# Patient Record
Sex: MALE | Race: WHITE | Employment: UNEMPLOYED | URBAN - METROPOLITAN AREA
[De-identification: names, ages, dates, MRNs, and addresses within clinical notes are randomized per-mention and may not be internally consistent; named-entity substitution may affect disease eponyms.]

---

## 2019-06-19 ENCOUNTER — HOSPITAL ENCOUNTER (INPATIENT)
Facility: CLINIC | Age: 39
LOS: 41 days | Discharge: HOME | DRG: 885 | End: 2019-07-30
Attending: EMERGENCY MEDICINE | Admitting: PSYCHIATRY & NEUROLOGY
Payer: MEDICARE

## 2019-06-19 DIAGNOSIS — F25.1 SCHIZOAFFECTIVE DISORDER, DEPRESSIVE TYPE (H): ICD-10-CM

## 2019-06-19 DIAGNOSIS — J45.909 ASTHMA, UNSPECIFIED ASTHMA SEVERITY, UNSPECIFIED WHETHER COMPLICATED, UNSPECIFIED WHETHER PERSISTENT: ICD-10-CM

## 2019-06-19 DIAGNOSIS — I10 HYPERTENSION, UNSPECIFIED TYPE: Primary | ICD-10-CM

## 2019-06-19 PROBLEM — F29 PSYCHOSIS (H): Status: ACTIVE | Noted: 2019-06-19

## 2019-06-19 LAB
AMPHETAMINES UR QL SCN: NEGATIVE
BARBITURATES UR QL: NEGATIVE
BENZODIAZ UR QL: NEGATIVE
CANNABINOIDS UR QL SCN: NEGATIVE
COCAINE UR QL: NEGATIVE
ETHANOL UR QL SCN: NEGATIVE
OPIATES UR QL SCN: NEGATIVE

## 2019-06-19 PROCEDURE — 12400001 ZZH R&B MH UMMC

## 2019-06-19 PROCEDURE — 99285 EMERGENCY DEPT VISIT HI MDM: CPT | Mod: 25 | Performed by: EMERGENCY MEDICINE

## 2019-06-19 PROCEDURE — 80320 DRUG SCREEN QUANTALCOHOLS: CPT | Performed by: FAMILY MEDICINE

## 2019-06-19 PROCEDURE — 90791 PSYCH DIAGNOSTIC EVALUATION: CPT

## 2019-06-19 PROCEDURE — 80307 DRUG TEST PRSMV CHEM ANLYZR: CPT | Performed by: FAMILY MEDICINE

## 2019-06-19 PROCEDURE — 99285 EMERGENCY DEPT VISIT HI MDM: CPT | Mod: Z6 | Performed by: EMERGENCY MEDICINE

## 2019-06-19 RX ORDER — LORAZEPAM 0.5 MG/1
0.5 TABLET ORAL 2 TIMES DAILY PRN
Status: DISCONTINUED | OUTPATIENT
Start: 2019-06-19 | End: 2019-06-28

## 2019-06-19 RX ORDER — ALBUTEROL SULFATE 90 UG/1
2-4 AEROSOL, METERED RESPIRATORY (INHALATION) EVERY 6 HOURS PRN
Status: ON HOLD | COMMUNITY
Start: 2018-09-20 | End: 2019-07-30

## 2019-06-19 RX ORDER — HYDROXYZINE HYDROCHLORIDE 25 MG/1
25 TABLET, FILM COATED ORAL EVERY 4 HOURS PRN
Status: DISCONTINUED | OUTPATIENT
Start: 2019-06-19 | End: 2019-07-30 | Stop reason: HOSPADM

## 2019-06-19 RX ORDER — OLANZAPINE 10 MG/1
10 TABLET ORAL
Status: DISCONTINUED | OUTPATIENT
Start: 2019-06-19 | End: 2019-07-10

## 2019-06-19 RX ORDER — ALBUTEROL SULFATE 90 UG/1
2-4 AEROSOL, METERED RESPIRATORY (INHALATION) EVERY 4 HOURS PRN
Status: DISCONTINUED | OUTPATIENT
Start: 2019-06-19 | End: 2019-07-30 | Stop reason: HOSPADM

## 2019-06-19 RX ORDER — OLANZAPINE 10 MG/2ML
10 INJECTION, POWDER, FOR SOLUTION INTRAMUSCULAR
Status: DISCONTINUED | OUTPATIENT
Start: 2019-06-19 | End: 2019-07-10

## 2019-06-19 RX ORDER — FLUOXETINE 10 MG/1
10 CAPSULE ORAL DAILY
Status: ON HOLD | COMMUNITY
End: 2019-07-30

## 2019-06-19 RX ORDER — ACETAMINOPHEN 325 MG/1
650 TABLET ORAL EVERY 4 HOURS PRN
Status: DISCONTINUED | OUTPATIENT
Start: 2019-06-19 | End: 2019-07-30 | Stop reason: HOSPADM

## 2019-06-19 RX ORDER — TRAZODONE HYDROCHLORIDE 50 MG/1
50 TABLET, FILM COATED ORAL
Status: DISCONTINUED | OUTPATIENT
Start: 2019-06-19 | End: 2019-07-30 | Stop reason: HOSPADM

## 2019-06-19 ASSESSMENT — ENCOUNTER SYMPTOMS
DECREASED CONCENTRATION: 1
HALLUCINATIONS: 1
CONFUSION: 1
HYPERACTIVE: 1
AGITATION: 1

## 2019-06-19 ASSESSMENT — ACTIVITIES OF DAILY LIVING (ADL)
ORAL_HYGIENE: PROMPTS
HYGIENE/GROOMING: HANDWASHING;SHOWER;PROMPTS
LAUNDRY: UNABLE TO COMPLETE
DRESS: PROMPTS

## 2019-06-19 NOTE — ED NOTES
Gets a little agitated at times when talking about Abilify; per Dr. Barraza's HO hold paperwork, pt thinks he has been off of Abilify for one year but has been off it since January;progressively worsening paranoia and agitation; loud, defensive; pt not eating well, disorganized and poor hygiene; verbally abusive with family.  Please see lengthy note by Dr Barraza on hold paperwork on pt chart.

## 2019-06-20 LAB
ALBUMIN SERPL-MCNC: 3.4 G/DL (ref 3.4–5)
ALP SERPL-CCNC: 67 U/L (ref 40–150)
ALT SERPL W P-5'-P-CCNC: 44 U/L (ref 0–70)
ANION GAP SERPL CALCULATED.3IONS-SCNC: 6 MMOL/L (ref 3–14)
AST SERPL W P-5'-P-CCNC: 20 U/L (ref 0–45)
BASOPHILS # BLD AUTO: 0 10E9/L (ref 0–0.2)
BASOPHILS NFR BLD AUTO: 0.3 %
BILIRUB SERPL-MCNC: 0.7 MG/DL (ref 0.2–1.3)
BUN SERPL-MCNC: 20 MG/DL (ref 7–30)
CALCIUM SERPL-MCNC: 8.3 MG/DL (ref 8.5–10.1)
CHLORIDE SERPL-SCNC: 108 MMOL/L (ref 94–109)
CHOLEST SERPL-MCNC: 160 MG/DL
CO2 SERPL-SCNC: 29 MMOL/L (ref 20–32)
CREAT SERPL-MCNC: 0.82 MG/DL (ref 0.66–1.25)
DEPRECATED CALCIDIOL+CALCIFEROL SERPL-MC: 26 UG/L (ref 20–75)
DIFFERENTIAL METHOD BLD: NORMAL
EOSINOPHIL # BLD AUTO: 0.2 10E9/L (ref 0–0.7)
EOSINOPHIL NFR BLD AUTO: 1.5 %
ERYTHROCYTE [DISTWIDTH] IN BLOOD BY AUTOMATED COUNT: 13 % (ref 10–15)
GFR SERPL CREATININE-BSD FRML MDRD: >90 ML/MIN/{1.73_M2}
GLUCOSE SERPL-MCNC: 92 MG/DL (ref 70–99)
HCT VFR BLD AUTO: 46.3 % (ref 40–53)
HDLC SERPL-MCNC: 40 MG/DL
HGB BLD-MCNC: 15.1 G/DL (ref 13.3–17.7)
IMM GRANULOCYTES # BLD: 0 10E9/L (ref 0–0.4)
IMM GRANULOCYTES NFR BLD: 0.2 %
LDLC SERPL CALC-MCNC: 98 MG/DL
LYMPHOCYTES # BLD AUTO: 2.8 10E9/L (ref 0.8–5.3)
LYMPHOCYTES NFR BLD AUTO: 28.1 %
MCH RBC QN AUTO: 27.9 PG (ref 26.5–33)
MCHC RBC AUTO-ENTMCNC: 32.6 G/DL (ref 31.5–36.5)
MCV RBC AUTO: 86 FL (ref 78–100)
MONOCYTES # BLD AUTO: 0.7 10E9/L (ref 0–1.3)
MONOCYTES NFR BLD AUTO: 6.6 %
NEUTROPHILS # BLD AUTO: 6.3 10E9/L (ref 1.6–8.3)
NEUTROPHILS NFR BLD AUTO: 63.3 %
NONHDLC SERPL-MCNC: 120 MG/DL
NRBC # BLD AUTO: 0 10*3/UL
NRBC BLD AUTO-RTO: 0 /100
PLATELET # BLD AUTO: 239 10E9/L (ref 150–450)
POTASSIUM SERPL-SCNC: 4.2 MMOL/L (ref 3.4–5.3)
PROT SERPL-MCNC: 6.8 G/DL (ref 6.8–8.8)
RBC # BLD AUTO: 5.41 10E12/L (ref 4.4–5.9)
SODIUM SERPL-SCNC: 143 MMOL/L (ref 133–144)
TRIGL SERPL-MCNC: 108 MG/DL
TSH SERPL DL<=0.005 MIU/L-ACNC: 1.23 MU/L (ref 0.4–4)
WBC # BLD AUTO: 10 10E9/L (ref 4–11)

## 2019-06-20 PROCEDURE — 12400001 ZZH R&B MH UMMC

## 2019-06-20 PROCEDURE — 80061 LIPID PANEL: CPT | Performed by: STUDENT IN AN ORGANIZED HEALTH CARE EDUCATION/TRAINING PROGRAM

## 2019-06-20 PROCEDURE — 85025 COMPLETE CBC W/AUTO DIFF WBC: CPT | Performed by: STUDENT IN AN ORGANIZED HEALTH CARE EDUCATION/TRAINING PROGRAM

## 2019-06-20 PROCEDURE — 99223 1ST HOSP IP/OBS HIGH 75: CPT | Mod: AI | Performed by: PSYCHIATRY & NEUROLOGY

## 2019-06-20 PROCEDURE — 36415 COLL VENOUS BLD VENIPUNCTURE: CPT | Performed by: STUDENT IN AN ORGANIZED HEALTH CARE EDUCATION/TRAINING PROGRAM

## 2019-06-20 PROCEDURE — 84443 ASSAY THYROID STIM HORMONE: CPT | Performed by: STUDENT IN AN ORGANIZED HEALTH CARE EDUCATION/TRAINING PROGRAM

## 2019-06-20 PROCEDURE — A9270 NON-COVERED ITEM OR SERVICE: HCPCS | Mod: GY | Performed by: STUDENT IN AN ORGANIZED HEALTH CARE EDUCATION/TRAINING PROGRAM

## 2019-06-20 PROCEDURE — 25000132 ZZH RX MED GY IP 250 OP 250 PS 637: Mod: GY | Performed by: STUDENT IN AN ORGANIZED HEALTH CARE EDUCATION/TRAINING PROGRAM

## 2019-06-20 PROCEDURE — 82306 VITAMIN D 25 HYDROXY: CPT | Performed by: STUDENT IN AN ORGANIZED HEALTH CARE EDUCATION/TRAINING PROGRAM

## 2019-06-20 PROCEDURE — 80053 COMPREHEN METABOLIC PANEL: CPT | Performed by: STUDENT IN AN ORGANIZED HEALTH CARE EDUCATION/TRAINING PROGRAM

## 2019-06-20 RX ORDER — MULTIPLE VITAMINS W/ MINERALS TAB 9MG-400MCG
1 TAB ORAL DAILY
COMMUNITY

## 2019-06-20 RX ADMIN — FLUOXETINE 30 MG: 20 CAPSULE ORAL at 09:06
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
## 2019-06-20 NOTE — PROGRESS NOTES
Case Management Note  6/20/2019    Contacted [facility] pre-petition screening, left  notifying of petition for commitment ([phone]). Faxed petition paperwork to [phone].    Addendum: Received call from Razia who processed petition. Cecilia Gaffney ([phone]) assigned as pre-petition screener. She will likely be out tomorrow around 10 AM. Would like to be notified whether patient has been informed of petition. This writer did not inform patient of petition to commit.     Completed initial psychosocial. Pt declined to complete personal plan of care at this time.     Saundra Dubois, MIRTHA, LADC

## 2019-06-20 NOTE — PLAN OF CARE
Problem: Adult Inpatient Plan of Care  Goal: Rounds/Family Conference  6/20/2019 1136 by Saundra Dubois [facility]  Outcome: No Change  Flowsheets (Taken 6/20/2019 1136)  Participants: ; nursing; physician  Note:   BEHAVIORAL TEAM DISCUSSION    Participants: Dr. Marjan MD; Dr. Suzie MD- Resident; Willis Jacobson-Student; Fannie Sofia- Student; Carolina Sheffield; Saundra Dubois-[facility]  Progress: New admit; continuing to assess  Continued Stay Criteria/Rationale: Patient is experiencing active psychosis, has exhibited poor self-care (eating rotten food) and aggressive behavior towards others. Requires further intervention, assessment.   Medical/Physical: Patient has been medication non-compliant since January 2019.   Precautions:   Behavioral Orders   Procedures    Code 1 - Restrict to Unit    Elopement precautions    Routine Programming     As clinically indicated    Single Room    Status 15     Every 15 minutes.     Plan: Complete psychosocial assessment, evaluate for decision  making capacity; possibility of petition for MI commitment w/Nicole if pt is determined to be unable to make decisions for himself and or a danger to self/others.   Rationale for change in precautions or plan: No change.
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
## 2019-06-20 NOTE — ED NOTES
Bed: BEC1  Expected date: 6/9/19  Expected time:   Means of arrival:   Comments:  for Assessments
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
## 2019-06-20 NOTE — PROGRESS NOTES
Patient slept 5 hours overnight. Patient unable to remain asleep. Patient closing his door with authority when staff is rounding.

## 2019-06-20 NOTE — ED NOTES
ED to Behavioral Floor Handoff    SITUATION  Texginny Garland is a 39 year old male who speaks English and lives in a home alone The patient arrived in the ED by private car from home with a complaint of Agitation (stopped taking abilify in January, slowly becoming increasingly agitated; seen talking to himself, rubbing against someone and then started yelling at them, C & C for EMS) and Paranoid (Hx of schizoaffective disorder; PD called to scene where pt was waving a crowbar and talking to self)  .The patient's current symptoms started/worsened 4 months(s) ago and during this time the symptoms have increased.   In the ED, pt was diagnosed with   Final diagnoses:   Schizoaffective disorder, depressive type (H)        Initial vitals were: BP: 137/82  Pulse: 86  Temp: 99.4  F (37.4  C)  Resp: 18  SpO2: 96 %   --------  Is the patient diabetic? No   If yes, last blood glucose? --     If yes, was this treated in the ED? --  --------  Is the patient inebriated (ETOH) No or Impaired on other substances? No  MSSA done? N/A  Last MSSA score: --    Were withdrawal symptoms treated? N/A  Does the patient have a seizure history? No. If yes, date of most recent seizure--  --------  Is the patient patient experiencing suicidal ideation? denies current or recent suicidal ideation     Homicidal ideation? denies current or recent homicidal ideation or behaviors.    Self-injurious behavior/urges? denies current or recent self injurious behavior or ideation.  ------  Was pt aggressive in the ED No  Was a code called No  Is the pt now cooperative? Yes  -------  Meds given in ED: Medications - No data to display   Family present during ED course? Yes  Family currently present? Yes    BACKGROUND  Does the patient have a cognitive impairment or developmental disability? No  Allergies:   Allergies   Allergen Reactions     Mold      Itchy eyes, congestion     Nkda [No Known Drug Allergies]    .   Social demographics are   Social  History     Socioeconomic History     Marital status: Single     Spouse name: None     Number of children: None     Years of education: None     Highest education level: None   Occupational History     None   Social Needs     Financial resource strain: None     Food insecurity:     Worry: None     Inability: None     Transportation needs:     Medical: None     Non-medical: None   Tobacco Use     Smoking status: Former Smoker   Substance and Sexual Activity     Alcohol use: Not Currently     Drug use: Not Currently     Sexual activity: None   Lifestyle     Physical activity:     Days per week: None     Minutes per session: None     Stress: None   Relationships     Social connections:     Talks on phone: None     Gets together: None     Attends Roman [employer] service: None     Active member of club or organization: None     Attends meetings of clubs or organizations: None     Relationship status: None     Intimate partner violence:     Fear of current or ex partner: None     Emotionally abused: None     Physically abused: None     Forced sexual activity: None   Other Topics Concern     None   Social History Narrative     None        ASSESSMENT  Labs results   Labs Ordered and Resulted from Time of ED Arrival Up to the Time of Departure from the ED   DRUG ABUSE SCREEN 6 CHEM DEP URINE ([facility])      Imaging Studies: No results found for this or any previous visit (from the past 24 hour(s)).   Most recent vital signs /82   Pulse 86   Temp 99.4  F (37.4  C) (Oral)   Resp 18   SpO2 96%    Abnormal labs/tests/findings requiring intervention:---   Pain control: pt had none  Nausea control: pt had none    RECOMMENDATION  Are any infection precautions needed (MRSA, VRE, etc.)? No If yes, what infection? --  ---  Does the patient have mobility issues? independently. If yes, what device does the pt use? ---  ---  Is patient on 72 hour hold or commitment? Yes If on 72 hour hold, have hold and rights been given to patient?  Yes  Are admitting orders written if after 10 p.m. ?N/A  Tasks needing to be completed:---     Doris Elena    [phone] [facility]

## 2019-06-20 NOTE — PHARMACY-ADMISSION MEDICATION HISTORY
Admission medication history interview status for the 6/19/2019 admission is complete. See Epic admission navigator for allergy information, pharmacy, prior to admission medications and immunization status.     Medication history interview sources:  patient, [facility] records    Changes made to PTA medication list (reason)  Added: Multivitamin per patient  Deleted: aripiprazole (has not used in over a year per patient, not in [facility] records)  Changed: clarified fluoxetine dose to 30 mg qAM    Additional medication history information (including reliability of information, actions taken by pharmacist):  -patient knew all of his medications and how he uses them, including doses (confirmed with [facility] records)  -patient states he stopped abilify because it wasn't working, requested his doctors took him off of it. He did not report side effects, although [facility] records indicate it may have contributed to some irritability and muscle twitches.         Prior to Admission medications    Medication Sig Last Dose Taking? Auth Provider   FLUoxetine (PROZAC) 10 MG capsule Take 10 mg by mouth daily Take with 20 mg capsule to make 30 mg morning dose 6/19/2019 at Unknown time Yes Reported, Patient   FLUoxetine (PROZAC) 20 MG capsule Take 20 mg by mouth daily Take with 10 mg capsule to make 30 mg morning dose. 6/19/2019 at Unknown time Yes Reported, Patient   multivitamin w/minerals (THERA-VIT-M) tablet Take 1 tablet by mouth daily 6/18/2019 Yes Unknown, Entered By History   albuterol (VENTOLIN HFA) 108 (90 Base) MCG/ACT inhaler Inhale 2-4 puffs into the lungs every 6 hours as needed for shortness of breath / dyspnea  More than a month at Unknown time  Reported, Patient   LORazepam (ATIVAN) 0.5 MG tablet Take 0.5 mg by mouth every 6 hours as needed for anxiety. More than a month at Unknown time  Reported, Patient         Medication history completed by: Aniket Forde RP on 6/20/2019 at 12:48 PM

## 2019-06-20 NOTE — PROGRESS NOTES
"Patient has remained withdrawn to room all day. Angry and irritable upon approach. Asked patient if he had eaten lunch, \"Ya, I ate my fuckin lunch!\" patient refused EKG. Pacing in room and staring out the window. Monitoring.         06/20/19 1400   Behavioral Health   Thinking distractable   Orientation situation, disoriented;person: oriented;place: oriented   Memory confabulation   Insight poor   Judgement impaired   Affect irritable;angry   Mood irritable;anxious   Physical Appearance/Attire attire appropriate to age and situation   Hygiene neglected grooming - unclean body, hair, teeth   1. Wish to be Dead No   Activity withdrawn;isolative;restless     "
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
## 2019-06-20 NOTE — PROGRESS NOTES
06/19/19 2215   Patient Belongings   Did you bring any home meds/supplements to the hospital?  No   Patient Belongings locker   Patient Belongings Put in Hospital Secure Location (Security or Locker, etc.) keys;clothing   Belongings Search Yes  (Renny ORTEGA)   Clothing Search   (Renny ORTEGA)   Second Staff   (Zain PARKS )       Items brought in on admission (6/19/19):   T-shirt, shorts (w/string), socks, shoes (w/laces), keys        A               Admission:  I am responsible for any personal items that are not sent to the safe or pharmacy.  [facility] is not responsible for loss, theft or damage of any property in my possession.    Signature:  _________________________________ Date: _______  Time: _____                                              Staff Signature:  ____________________________ Date: ________  Time: _____      2nd Staff person, if patient is unable/unwilling to sign:    Signature: ________________________________ Date: ________  Time: _____     Discharge:  [facility] has returned all of my personal belongings:    Signature: _________________________________ Date: ________  Time: _____                                          Staff Signature:  ____________________________ Date: ________  Time: _____          Postop Diagnosis: same

## 2019-06-21 PROCEDURE — 99232 SBSQ HOSP IP/OBS MODERATE 35: CPT | Mod: GC | Performed by: PSYCHIATRY & NEUROLOGY

## 2019-06-21 PROCEDURE — 12400001 ZZH R&B MH UMMC

## 2019-06-21 PROCEDURE — 25000132 ZZH RX MED GY IP 250 OP 250 PS 637: Mod: GY | Performed by: STUDENT IN AN ORGANIZED HEALTH CARE EDUCATION/TRAINING PROGRAM

## 2019-06-21 RX ADMIN — FLUOXETINE 30 MG: 20 CAPSULE ORAL at 09:23

## 2019-06-21 ASSESSMENT — ACTIVITIES OF DAILY LIVING (ADL)
HYGIENE/GROOMING: INDEPENDENT
DRESS: INDEPENDENT
ORAL_HYGIENE: INDEPENDENT

## 2019-06-21 NOTE — PROGRESS NOTES
"Pt isolative to room entire shift, ate meals in room. Neglected ADL's. Pt will not make eye contact. Angry in the morning about breakfast, \"I don't want the fucking orange juice, I didn't get enough food, just get me more fucking food.\" Appears paranoid, but denies. Pt endorses depression, but when asked to rate on scale of 1-10 with 10 being high, pt says 10, but then quickly changes answer to 1 and says, \"I'm not very depressed.\" Irritable and dismissive when engaged. Polite in short interactions.      06/21/19 1500   Behavioral Health   Hallucinations denies / not responding to hallucinations   Thinking delusional;paranoid   Orientation situation, disoriented   Insight denial of illness   Judgement impaired   Eye Contact into space   Affect irritable   Mood depressed   Physical Appearance/Attire attire appropriate to age and situation   Hygiene neglected grooming - unclean body, hair, teeth   Suicidality other (see comments)  (denies )   1. Wish to be Dead No   2. Non-Specific Active Suicidal Thoughts  No   Self Injury other (see comment)  (denies )   Activity isolative   Speech clear;coherent   Medication Sensitivity no stated side effects;no observed side effects   Psychomotor / Gait balanced;steady   Activities of Daily Living   Hygiene/Grooming independent   Oral Hygiene independent   Dress independent   Room Organization independent     "

## 2019-06-21 NOTE — PROGRESS NOTES
"Patient remained isolative and withdrawn this evening. Patient ate supper in his room. Mom and sister visited, but left shortly after patient accused them of tampering with some personal items in his car. Patient was quite hostile at both, calling his sister a \"Nazi\" at one point. Monitoring.         06/20/19 2100   Behavioral Health   Thinking delusional;distractable   Orientation situation, disoriented;person: oriented;place: oriented   Memory confabulation   Insight poor;denial of illness   Judgement impaired   Affect angry;irritable   Mood irritable;anxious;labile   Physical Appearance/Attire attire appropriate to age and situation   1. Wish to be Dead No   Activity withdrawn;isolative;restless     "

## 2019-06-21 NOTE — PROGRESS NOTES
"    ----------------------------------------------------------------------------------------------------------  [facility], [place]   Psychiatric Progress Note  Hospital Day #2     Interim History:   The patient's care was discussed with the treatment team and chart notes were reviewed.    Sleep: 6.5 hrs  Scheduled Medications: Taken as prescribed  Staff Report: Withdrawn, irritable upon approach, remains in his room most of the time. Mom and sister visited; they left shortly after arrival; he accused them of tampering with his personal items and called his sister a \"Nazi.\"      Patient Interview: Patient was interviewed in his room on 22 NB. He remained prone on his bed and didn't make eye contact. Upon us entering, he stated \"oh it's you guys again, I already saw you today\" despite us not having seen Tex today, He was very irritable and abrupt with us. Reported no SI no HI. Seemed agitated, slight hand tremors visible. Repeatedly implied team was withholding information or feigning ignorance. He was not interested in talking to us for a longer period of time.    The risks, benefits, alternatives and side effects of any medication changes have been discussed and are understood by the patient and other caregivers.    Review of systems:     ROS was negative unless noted above.          Allergies:     Allergies   Allergen Reactions     Mold      Itchy eyes, congestion     Nkda [No Known Drug Allergies]             Psychiatric Examination:   /88 (BP Location: Right arm)   Pulse 71   Temp 98.4  F (36.9  C) (Oral)   Resp 18   Ht 1.829 m (6')   SpO2 97%   BMI 42.86 kg/m    Weight is 0 lbs 0 oz  Body mass index is 42.86 kg/m .    Appearance:  dressed in hospital scrubs, appeared as age stated, poorly groomed, BMI = 42.86 and slightly unkempt  Attitude:  evasive, guarded and uncooperative  Eye Contact:  poor , gaze averted when responding to questions  Mood:  \"Im fine\"  Affect:  " mood congruent, intensity is heightened and guarded, labile affect with brief periods of politeness  Speech:  short responses  Psychomotor Behavior:  no evidence of tardive dyskinesia, dystonia, or tics  Thought Process:  linear and illogical at times  Associations:  no loose associations  Thought Content:  no evidence of suicidal ideation or homicidal ideation, no obvious AH/VH. Quite paranoid, bordering on delusional.  Insight:  poor, denies need for hospitalization or medications  Judgment:  poor, denies need for hospitalization or medication  Oriented to:  time, person, and place  Attention Span and Concentration:  fair  Recent and Remote Memory:  limited  Language: speaks fluent english  Fund of Knowledge: Difficult to assess  Muscle Strength and Tone: normal  Gait and Station: Did not assess         Labs:       No results found for this or any previous visit (from the past 24 hour(s)).     Assessment    Diagnostic Impression: Tex Garland is a 39 year old male with a past psychiatric history of schizoaffective disorder depressed type and seasonal affective disorder presented due to paranoia, disorganization, and agitation in the context of medication nonadherence. The patient has no known family history of mental illness. Current social factors include unemployment, lives alone, not having a car or phone, father with Parkinson's/Lewy Body dementia. Psychological factors include previous diagnoses of schizoaffective disorder depressed type and seasonal affective disorders. Biological factors include medication non-compliance. There appears to be a strong biosocial component to his illness. Differential diagnosis include schizophrenia, schizoaffective disorder in the context of medication non-adherence, and substance abuse disorder. Leading diagnosis is schizophrenia given his marked disorganization and paranoia, and lack of strong mood symptoms and substance use. There does not appear to be a strong  mood component to his presentation during this hospitalization, and there haven't been major mood events since his last hospitalization eight years ago, which supports the diagnosis of schizophrenia more than that of schizoaffective disorder.    Hospital course: Tex Garland was admitted to station 22 NB on a 72 hour hold. Adding aripiprazole or other antipsychotics was discussed with the patient, but he refused. Teat recommended tapering off fluoxetine due to its adverse side effect, but he refused--since he is declining antipsychotics and therapeutic benefit of fluxoetine is uncertain, it was discontinued . Due to the need for [id number] in the past and a history of altercations with strangers, and poor hygiene including rotting food in apartment, there is concern for harm to self or to others. Thus, petition for commitment and rodriguez was initiated. The pre-petition screener came to talk to him today.    Discontinued Medications (& Rationale):  Fluoxetine- Anorgasmia    Medical course  Tex Garland was brought in by EMS to [facility] ED on 6/19/2019. He was transferred to [facility] for psychiatric evaluation.    Plan   Principal Diagnosis:   # Schizophrenia in the context of medication non-adherence.    Secondary psychiatric diagnoses of concern this admission:     Psychotropic Medications:  Modify:  -Discontinue Fluoxetine    Continue:  - Acetaminophen 650 mg PRN for pain  - Albuterol inhaler PRN for wheezing, shortness of breath/dyspnea  - Hydroxyzine 25 mg PRN for anxiety  - Lorazepam 0.5 mg PRN for anxiety  - Olanzapine 10 mg PRN for agitation  - Trazodone 50 mg PRN for sleep    Patient will be treated in therapeutic milieu with appropriate individual and group therapies as described.      Medical diagnoses to be addressed this admission:   none      Data: see above  Consults: None  Legal Status: 72-h Hold signed on 6/19/2019    Safety Assessment:   Behavioral Orders   Procedures      Code 1 - Restrict to Unit     Elopement precautions     Routine Programming     As clinically indicated     Single Room     Status 15     Every 15 minutes.       Disposition: unknown pending medication management and clinical stabilization    Kalennavjot Sofia, MS3     I was present with the medical student who participated in the service and in the  documentation of the note. I have verified the history and personally performed the  exam and medical decision making. I agree with the assessment and plan of  care as documented in the note.    Ifeanyi Larsen  PGY-1 Psychiatry    Psychiatry Attending Attestation:  This patient has been seen and evaluated by me, Jake Phillip M.D.  The patient's condition and treatment plan were discussed with the resident, and care coordinated with the CTC and RN. I reviewed, edited and agree with the findings and plan in this note.     Jake Phillip M.D.   of Psychiatry

## 2019-06-22 PROCEDURE — 12400001 ZZH R&B MH UMMC

## 2019-06-22 ASSESSMENT — ACTIVITIES OF DAILY LIVING (ADL)
DRESS: 0-->INDEPENDENT
FALL_HISTORY_WITHIN_LAST_SIX_MONTHS: NO
BATHING: 0-->INDEPENDENT
ORAL_HYGIENE: INDEPENDENT
SWALLOWING: 0-->SWALLOWS FOODS/LIQUIDS WITHOUT DIFFICULTY
AMBULATION: 0-->INDEPENDENT
TRANSFERRING: 0-->INDEPENDENT
RETIRED_COMMUNICATION: 0-->UNDERSTANDS/COMMUNICATES WITHOUT DIFFICULTY
RETIRED_EATING: 0-->INDEPENDENT
COGNITION: 0 - NO COGNITION ISSUES REPORTED
LAUNDRY: WITH SUPERVISION
TOILETING: 0-->INDEPENDENT
DRESS: SCRUBS (BEHAVIORAL HEALTH);STREET CLOTHES

## 2019-06-22 NOTE — PROGRESS NOTES
Patient remains isolative and withdrawn to room. Appetite sparse. Visibly irritable and anxious. Appears preoccupied. Mom visited this afternoon. Patient states he is unable to shower because of the design on the shower door. Patient also states he is leaving at 3pm today.          06/22/19 1400   Behavioral Health   Hallucinations appears responding   Thinking distractable;delusional;paranoid;poor concentration   Orientation person: oriented;place: oriented   Memory confabulation   Insight poor   Judgement impaired   Affect angry;tense;irritable   Mood anxious;irritable   Physical Appearance/Attire untidy;disheveled   Hygiene neglected grooming - unclean body, hair, teeth;body odor  (Encouraging shower. )   1. Wish to be Dead   (Difficult to assess. Patient guarded and oppositional.)   Activity restless;withdrawn;isolative   Speech pressured;rambling

## 2019-06-22 NOTE — PROGRESS NOTES
"   Pt lying in bed with head raised, looking out door window.   Pt came out briefly to grab snacks and walked briskly towards room.   Pt stopped when addressed by writer, but responded \"What's up with my 72 Hours?\"    When writer explained that it ceases on the weekend, pt replied \"Oh; that's it.  OK. Thankyou.\" , and continued to room.  "

## 2019-06-22 NOTE — PLAN OF CARE
Patient spent most of shift in room, including dinner which he quickly took to room after receiving.  Pt slightly reluctant to accept mother and sister in for visit; but the visit went without incident.  Pt's mother told staff prior to departure that patient thinks he will be able to leave tomorrow.

## 2019-06-23 PROCEDURE — 12400001 ZZH R&B MH UMMC

## 2019-06-23 NOTE — PROGRESS NOTES
Patient remains isolative and withdrawn to room. Patient eats all meals in rooms. Appetite good. Appears suspicious and is guarded. Mom and sister visiting today. Monitoring.         06/23/19 1331   Behavioral Health   Hallucinations appears responding   Thinking confused;distractable;delusional;paranoid   Orientation situation, disoriented;person: oriented;place: oriented   Insight denial of illness;poor   Judgement impaired   Affect tense   Mood irritable;depressed   1. Wish to be Dead No   Activity withdrawn;isolative   Speech pressured;rambling;flight of ideas

## 2019-06-24 PROCEDURE — 99232 SBSQ HOSP IP/OBS MODERATE 35: CPT | Mod: GC | Performed by: PSYCHIATRY & NEUROLOGY

## 2019-06-24 PROCEDURE — 12400001 ZZH R&B MH UMMC

## 2019-06-24 ASSESSMENT — ACTIVITIES OF DAILY LIVING (ADL)
DRESS: INDEPENDENT
ORAL_HYGIENE: INDEPENDENT
HYGIENE/GROOMING: INDEPENDENT
DRESS: STREET CLOTHES;INDEPENDENT
ORAL_HYGIENE: INDEPENDENT
HYGIENE/GROOMING: INDEPENDENT
LAUNDRY: UNABLE TO COMPLETE

## 2019-06-24 NOTE — PROGRESS NOTES
Keren from [facility] court called . Tex Garland was placed on a court hold at 15:52 on 6/24/2019,  notified

## 2019-06-24 NOTE — PLAN OF CARE
Patient spent most shift in room -- even supper-- often looking out door window.   Pt sarchastic at times.  Pt very odoriferous.

## 2019-06-24 NOTE — PROGRESS NOTES
06/24/19 1500   Behavioral Health   Hallucinations other (see comment)  (Unable to assess-pt refused check-in)   Thinking distractable   Orientation person: oriented   Memory other (see comment)  (unable to assess)   Insight other (see comment)  (unable to assess)   Judgement impaired   Eye Contact into space;out of corner of eyes   Affect blunted, flat   Mood mood is calm   Physical Appearance/Attire disheveled   Hygiene neglected grooming - unclean body, hair, teeth   Suicidality other (see comments)  (none stated nor observed)   1. Wish to be Dead   (none stated nor observed)   2. Non-Specific Active Suicidal Thoughts    (none stated nor observed\)   Self Injury other (see comment)  (none stated nor observed)   Activity isolative   Speech pressured;clear   Medication Sensitivity no stated side effects;no observed side effects   Psychomotor / Gait slow;steady   Activities of Daily Living   Hygiene/Grooming independent   Oral Hygiene independent   Dress independent   Laundry unable to complete   Room Organization independent     Tex had a stable shift. Pt was isolative in his room, only emerging to bring his tray or ask a question.     Pt refused check-in with staff. However, there were no signs of SI/SIB/behaviors of concern.     Other staff reported pt placed a bar of soap under paper near the entry of his room, potentially as an obstacle for people entering room. Writer also noticed pt's room was quite disorganized/messy.     Pt has no imminent questions or concerns expressed at this time.

## 2019-06-24 NOTE — PROGRESS NOTES
"    ----------------------------------------------------------------------------------------------------------  [facility], [place]   Psychiatric Progress Note  Hospital Day #5     Interim History:   The patient's care was discussed with the treatment team and chart notes were reviewed.    Sleep: 1.5 hrs  Scheduled Medications: None  Staff Report: Rating for depression fluctuates between 10 (highest) and 1 lowest in a single sentence. Agitated, yelled at one of the RNs because his fluoxetine wasn't given. Patient had a visit from mom and sister, which went well. Generally isolative to room, poor hygiene, refused to shower.     Patient Interview: Patient was interviewed in his room on 22 NB. He was awakened, and remained prone on his bed but didn't make eye contact. Patient's answers were short, generally one-word answers. When asked open-ended questions he would not expand on them. Things were \"good\" over the weekend and the meeting with his family was \"good\". When asked about showering he said \"no, the shower here sucks, it's too small\". Says he took a shower with water from his sink yesterday. Was informed he is now on a court hold and is awaiting trial, to which he replied \"fine\".     The risks, benefits, alternatives and side effects of any medication changes have been discussed and are understood by the patient and other caregivers.    Review of systems:     ROS was negative unless noted above.          Allergies:     Allergies   Allergen Reactions     Mold      Itchy eyes, congestion     Nkda [No Known Drug Allergies]             Psychiatric Examination:   BP (!) 142/93 (BP Location: Right arm)   Pulse 75   Temp 98.7  F (37.1  C) (Oral)   Resp 18   Ht 1.829 m (6')   SpO2 97%   BMI 42.86 kg/m    Weight is 0 lbs 0 oz  Body mass index is 42.86 kg/m .    Appearance:  dressed in hospital scrubs, appeared as age stated, poorly groomed, BMI = 42.86 and slightly unkempt  Attitude:  " "evasive, guarded and uncooperative  Eye Contact:  poor , gaze averted when responding to questions  Mood:  \"good\"  Affect:  mood congruent and intensity is blunted  Speech:  short responses, somewhat rapid  Psychomotor Behavior:  no evidence of tardive dyskinesia, dystonia, or tics  Thought Process:  linear and illogical at times  Associations:  no loose associations  Thought Content: based on past encounters, no evidence of suicidal ideation or homicidal ideation, no obvious AH/VH.   Insight:  poor  Judgment:  poor  Oriented to:  time, person, and place  Attention Span and Concentration:  fair  Recent and Remote Memory:  limited  Language: speaks fluent english  Fund of Knowledge: Difficult to assess  Muscle Strength and Tone: normal  Gait and Station: Did not assess         Labs:       No results found for this or any previous visit (from the past 24 hour(s)).     Assessment    Diagnostic Impression: Tex Garland is a 39 year old male with a past psychiatric history of schizoaffective disorder depressed type and seasonal affective disorder presented due to paranoia, disorganization, and agitation in the context of medication nonadherence. The patient has no known family history of mental illness. Current social factors include unemployment, lives alone, not having a car or phone, father with Parkinson's/Lewy Body dementia. Psychological factors include previous diagnoses of schizoaffective disorder depressed type and seasonal affective disorders. Biological factors include medication non-compliance. There appears to be a strong biosocial component to his illness. Differential diagnosis include schizophrenia, schizoaffective disorder in the context of medication non-adherence, and substance abuse disorder. Leading diagnosis is schizophrenia given his marked disorganization and paranoia, and lack of strong mood symptoms and substance use. There does not appear to be a strong mood component to his " presentation during this hospitalization, and there haven't been major mood events since his last hospitalization eight years ago, which supports the diagnosis of schizophrenia more than that of schizoaffective disorder.    Hospital course: Tex Graland was admitted to station 22 NB on a 72 hour hold. Restarting aripiprazole or other antipsychotics was discussed with the patient, but he refused, as he attributed anorgasmia to abilify. Team recommended tapering off fluoxetine due to its adverse side effect, but he refused--since he is declining antipsychotics and therapeutic benefit of fluxoetine is uncertain, it was discontinued . Due to the need for dxbaga-gr-[id number] in the past and a history of altercations with strangers, and poor hygiene including rotting food in apartment, there is concern for harm to self or to others. Thus, petition for commitment and rodriguez was initiated. The pre-petition screener came to talk to him last week, and a court hold is currently in place.    Discontinued Medications (& Rationale):  Fluoxetine- Anorgasmia    Medical course  Tex Garland was brought in by EMS to [facility] [place] ED on 6/19/2019. He was transferred to [facility] for psychiatric evaluation.    Plan   Principal Diagnosis:   # Schizophrenia in the context of medication non-adherence.    Secondary psychiatric diagnoses of concern this admission:     Psychotropic Medications:  Modify:    Continue:  - Acetaminophen 650 mg PRN for pain  - Albuterol inhaler PRN for wheezing, shortness of breath/dyspnea  - Hydroxyzine 25 mg PRN for anxiety  - Lorazepam 0.5 mg PRN for anxiety  - Olanzapine 10 mg PRN for agitation  - Trazodone 50 mg PRN for sleep    Patient will be treated in therapeutic milieu with appropriate individual and group therapies as described.      Medical diagnoses to be addressed this admission:   none      Data: see above  Consults: None  Legal Status: Court Hold    Safety Assessment:   Behavioral  Orders   Procedures     Code 1 - Restrict to Unit     Elopement precautions     Routine Programming     As clinically indicated     Single Room     Status 15     Every 15 minutes.       Disposition: unknown pending medication management and clinical stabilization    Fannie Sofia, MS3     I was present with the medical student who participated in the service and in the  documentation of the note. I have verified the history and personally performed the  exam and medical decision making. I agree with the assessment and plan of  care as documented in the note.    Ifeanyi Larsen  PGY-1 Psychiatry      Attestation:  This patient has been seen and evaluated by me, Shawna Lugo.  I have discussed this patient with the house staff team including the resident and medical student and I agree with the findings and plan in this note.    I have reviewed today's vital signs, medications, labs and imaging. Shawna Lugo M.D., Ph.D.

## 2019-06-24 NOTE — PLAN OF CARE
"2502 - 7833: Pt agitated and pacing in room. Out once for snack; guarded; walked rapidly back to room. Pt struggling to use sink in bathroom with motion sensor; noted to be cursing \"The sink is fucking broken.\" Staff offered to help with sink and patient refused \"It's working. Why the fuck are you in here. Get out.\" Pt not sleeping.    0350: Writer approached patient to ask if would like a medication to help with sleep; pt replied \"Yeah, I want my Prozac. They have been forgetting to give it to me.\" Writer informed patient that Prozac has been discontinued for several days and that patient has other medications available for sleep and agitation. Pt grew angry and yelled \"Why are you fucking with me? I want my 30 mg of Prozac.\" Pt continued to yell expletives at writer. Pt did calm and yell out \"Ok, bye.\"    Pt is not compliant taking PRN medications. Pt with poor insight into condition and struggling with ADLs. Pt suspicious of staff entering room to offer help with care, cleaning, or check-in. Pt refused check in by writer.    0605: Pt slept 1.5 hours on night shift. Did spend 3.5 hours lying in bed calmly at end of shift; reading or staring out window.  "

## 2019-06-25 PROCEDURE — 99232 SBSQ HOSP IP/OBS MODERATE 35: CPT | Mod: GC | Performed by: PSYCHIATRY & NEUROLOGY

## 2019-06-25 PROCEDURE — 12400001 ZZH R&B MH UMMC

## 2019-06-25 ASSESSMENT — ACTIVITIES OF DAILY LIVING (ADL)
HYGIENE/GROOMING: HANDWASHING;SHOWER;INDEPENDENT
LAUNDRY: WITH SUPERVISION
DRESS: STREET CLOTHES
ORAL_HYGIENE: INDEPENDENT

## 2019-06-25 NOTE — PROGRESS NOTES
"    ----------------------------------------------------------------------------------------------------------  [facility], [place]   Psychiatric Progress Note  Hospital Day #6     Interim History:   The patient's care was discussed with the treatment team and chart notes were reviewed.    Sleep: 4.5 hrs  Scheduled Medications: None  Staff Report:   Remains isolated and withdrawn. Continues to appear restless and paranoid. No SI and no HI. Slammed door to room repeatedly.     Patient Interview: Patient was interviewed in his room on 22 NB. He was lying on his bed with his eyes closed. Patient stated he had slept poorly, but did not want to take any PRNs to help with this. Patient was not cooperative to questioning. He was angry that we had stopped his fluoxetine. He did not want to take any antipsychotics. He spoke in a sarcastic tone and ended the conversation with \"get away from me.\" When asked if he would be interested in doing some copper molding in OT he said no. Is not willing to consider going on any anti-psychotic medications, or any others besides prozac.     Treatment team spoke with patient's mother Kirk. She is disappointed that Tex is so psychotic he needs to stay in his room all day. She wonders if staff are doing enough to occupy him during his stay, and was also frustrated that Tex did not like his bathroom door, \"Why would you put a door like that in a psychosis unit, it should be neutral\". Reports that during prior hospitalization Tex liked doing copper molding in ECT. Says she will continue visit him everyday.     The risks, benefits, alternatives and side effects of any medication changes have been discussed and are understood by the patient and other caregivers.    Review of systems:     ROS was negative unless noted above.          Allergies:     Allergies   Allergen Reactions     Mold      Itchy eyes, congestion     Nkda [No Known Drug Allergies]             " Psychiatric Examination:   BP (!) 136/95   Pulse 98   Temp 97.9  F (36.6  C)   Resp 16   Ht 1.829 m (6')   SpO2 96%   BMI 42.86 kg/m    Weight is 0 lbs 0 oz  Body mass index is 42.86 kg/m .    Appearance:  dressed in hospital scrubs, appeared as age stated, poorly groomed, BMI = 42.86 and slightly unkempt  Attitude:  evasive, guarded, uncooperative and sarcastic  Eye Contact:  poor , kept his eyes closed during the entire interaction  Mood:  frustrated  Affect:  mood congruent and intensity is blunted  Speech:  short responses, somewhat rapid  Psychomotor Behavior:  no evidence of tardive dyskinesia, dystonia, or tics  Thought Process:  linear and illogical at times  Associations:  no loose associations  Thought Content: based on past encounters, no evidence of suicidal ideation or homicidal ideation, no obvious AH/VH.   Insight:  poor  Judgment:  poor  Oriented to:  time, person, and place  Attention Span and Concentration:  poor  Recent and Remote Memory:  limited  Language: speaks fluent english  Fund of Knowledge: Difficult to assess  Muscle Strength and Tone: Did not assess  Gait and Station: Did not assess         Labs:       No results found for this or any previous visit (from the past 24 hour(s)).     Assessment    Diagnostic Impression: Tex Garland is a 39 year old male with a past psychiatric history of schizoaffective disorder depressed type and seasonal affective disorder presented due to paranoia, disorganization, and agitation in the context of medication nonadherence. The patient has no known family history of mental illness. Current social factors include unemployment, lives alone, not having a car or phone, father with Parkinson's/Lewy Body dementia. Psychological factors include previous diagnoses of schizoaffective disorder depressed type and seasonal affective disorders. Biological factors include medication non-compliance. There appears to be a strong biosocial component to  his illness. Differential diagnosis include schizophrenia, schizoaffective disorder in the context of medication non-adherence, and substance abuse disorder. Leading diagnosis is schizophrenia given his marked disorganization and paranoia, and lack of strong mood symptoms and substance use. There does not appear to be a strong mood component to his presentation during this hospitalization, and there haven't been major mood events since his last hospitalization eight years ago, which supports the diagnosis of schizophrenia more than that of schizoaffective disorder.    Hospital course: Tex Garland was admitted to station 22 NB on a 72 hour hold. Restarting aripiprazole or other antipsychotics was discussed with the patient, but he refused, as he attributed anorgasmia to abilify. Team recommended tapering off fluoxetine due to its adverse side effect, but he refused--since he is declining antipsychotics and therapeutic benefit of fluxoetine is uncertain, it was discontinued . Due to the need for [id number] in the past and a history of altercations with strangers, and poor hygiene including rotting food in apartment, there is concern for harm to self or to others. Thus, petition for commitment and nicole was initiated. The pre-petition screener came to talk to him last week, and a court hold is currently in place; we are waiting for commitment and Nicole.    Discontinued Medications (& Rationale):  Fluoxetine- Anorgasmia    Medical course  Tex Garland was brought in by EMS to [facility] ED on 6/19/2019. He was transferred to [facility] for psychiatric evaluation.    Plan   Principal Diagnosis:   # Schizophrenia in the context of medication non-adherence.    Secondary psychiatric diagnoses of concern this admission:     Psychotropic Medications:  Modify:    Continue:  - Acetaminophen 650 mg PRN for pain  - Albuterol inhaler PRN for wheezing, shortness of breath/dyspnea  - Hydroxyzine 25 mg PRN  for anxiety  - Lorazepam 0.5 mg PRN for anxiety  - Olanzapine 10 mg PRN for agitation  - Trazodone 50 mg PRN for sleep    Patient will be treated in therapeutic milieu with appropriate individual and group therapies as described.      Medical diagnoses to be addressed this admission:   none      Data: see above  Consults: None  Legal Status: Court Hold    Safety Assessment:   Behavioral Orders   Procedures     Code 1 - Restrict to Unit     Elopement precautions     Routine Programming     As clinically indicated     Single Room     Status 15     Every 15 minutes.       Disposition: unknown pending medication management and clinical stabilization    Fannie Sofia, MS3     I was present with the medical student who participated in the service and in the  documentation of the note. I have verified the history and personally performed the  exam and medical decision making. I agree with the assessment and plan of  care as documented in the note.    Ifeanyi Larsen  PGY-1 Psychiatry    Psychiatry Attending Attestation:  This patient has been seen and evaluated by me, Jake Phillip M.D.  The patient's condition and treatment plan were discussed with the resident, and care coordinated with the CTC and RN. I reviewed, edited and agree with the findings and plan in this note.     Jake Phillip M.D.   of Psychiatry

## 2019-06-25 NOTE — PROGRESS NOTES
Patient remains isolative and withdrawn. Appears irritable and restless. Patient is fairly pleasant when approached, however, is superficial and offers little information. Guarded. Appetite is good. Patient eats all meals in room. Poor hygiene. Encouraging shower but patient refuses.          06/25/19 1149   Behavioral Health   Hallucinations appears responding   Thinking distractable;delusional;paranoid;poor concentration   Orientation situation, disoriented;person: oriented;place: oriented   Memory confabulation   Insight poor;denial of illness   Judgement impaired   Affect tense   Mood anxious;irritable   Physical Appearance/Attire attire appropriate to age and situation   Hygiene neglected grooming - unclean body, hair, teeth   1. Wish to be Dead No   Activity withdrawn;restless;isolative   Speech pressured;flight of ideas

## 2019-06-25 NOTE — PROGRESS NOTES
Pt was slamming door, writer redirected pt.    Writer - Jud Nice, please don't slam don't slam your door.    PT - Oh, okay.    PT - When's dinner.    Writer - 6:00pm, It's about 4:00pm currently.    PT - Marv, I gave it to you but you didn't give it back to me anyway.             - Pt then Shut door

## 2019-06-25 NOTE — PROGRESS NOTES
Pt was isolative and socially withdrawn throughout most of the shift. Pt was mostly observed pacing around room, talking to himself in room, journaling, eating dinner (in room), and meeting with visitors. However, pt became upset while meeting with visitors when told about court hold. Pt presented with a tense to irritable affect and appeared restless and extremely paranoid throughout the shift. Pt was observed slamming his door repeatedly during shift and was somewhat redirectable from this behavior. Pt did not report experiencing SI, HI, or SIB and no clear indication of these symptoms was observed.        06/24/19 2148   Behavioral Health   Hallucinations   (unsubstantiated)   Thinking distractable;paranoid;poor concentration   Orientation person: oriented   Memory   (unsubstantiated)   Insight poor   Judgement impaired   Eye Contact out of corner of eyes;at examiner   Affect angry;tense;irritable   Mood anxious;irritable   Physical Appearance/Attire untidy   Hygiene neglected grooming - unclean body, hair, teeth   Suicidality   (pt did not report thoughts or urges)   Self Injury   (pt did not report thoughts or urges)   Elopement Distress about legal situation (holds for mental health or criminal)   Activity isolative;withdrawn;restless   Speech pressured;clear;coherent   Medication Sensitivity no stated side effects;no observed side effects   Psychomotor / Gait fast;balanced;steady   Activities of Daily Living   Hygiene/Grooming independent   Oral Hygiene independent   Dress street clothes;independent   Laundry   (pt did not do laundry)   Room Organization independent   Activity   Activity Assistance Provided independent

## 2019-06-26 PROCEDURE — 99232 SBSQ HOSP IP/OBS MODERATE 35: CPT | Mod: GC | Performed by: PSYCHIATRY & NEUROLOGY

## 2019-06-26 PROCEDURE — 12400001 ZZH R&B MH UMMC

## 2019-06-26 ASSESSMENT — ACTIVITIES OF DAILY LIVING (ADL)
DRESS: INDEPENDENT
HYGIENE/GROOMING: INDEPENDENT
ORAL_HYGIENE: PROMPTS
HYGIENE/GROOMING: PROMPTS

## 2019-06-26 NOTE — PROGRESS NOTES
Isolative to room the whole shift. Meals brought to him in his room. Angry, irritable much of shift. Freq door slamming. Angry with team, profanity. No shower.       06/26/19 1400   Behavioral Health   Hallucinations appears responding   Thinking paranoid;distractable;delusional;poor concentration   Orientation situation, disoriented   Memory confabulation   Insight poor;denial of illness   Judgement impaired   Eye Contact out of corner of eyes;at examiner   Affect tense;irritable;angry   Mood anxious;irritable   Physical Appearance/Attire disheveled   Hygiene neglected grooming - unclean body, hair, teeth   1. Wish to be Dead No   2. Non-Specific Active Suicidal Thoughts  No   Activity isolative;refusal   Speech pressured   Psychomotor / Gait balanced;steady   Psycho Education   Type of Intervention structured groups   Response refuses   Activities of Daily Living   Hygiene/Grooming independent  (Declined all)

## 2019-06-26 NOTE — PROGRESS NOTES
Re: Civil Committment    Received call from [facility] and they are supporting petition.    Dipti Cobb, LPCC, LADC

## 2019-06-26 NOTE — PLAN OF CARE
"Tex was very angry at his mother yesterday. Today he is less angry. Was asked if he would like both her and his sister to visit and he responded \"yes.\" He is superficial with staff. Declined a 1:1 talk, but answered that he feels safe here. His mother asked if she could visit tomorrow at 1 pm. Asked her to check with tomorrow's charge nurse. He has remained isolative to his room, lying on his bed, writing, etc.  "

## 2019-06-26 NOTE — PROGRESS NOTES
"    ----------------------------------------------------------------------------------------------------------  [facility], [place]   Psychiatric Progress Note  Hospital Day #7     Interim History:   The patient's care was discussed with the treatment team and chart notes were reviewed.    Sleep: 4.75 hrs  Scheduled Medications: None  Staff Report:   Remains isolated and withdrawn, with evidence of disorganized thoughts. Patient's mom may visit at 1pm today.    Patient Interview: Patient was interviewed in his room on 22 NB. He was standing in front of the door and preferred we didn't enter much further beyond the threshold. When asked how he was doing, stated \"my blood pressure was good this morning.\" Stated that he felt he slept alright (4hrs) - even though he later stated that his normal sleep duration is 7-8 hrs. Explained to patient that he has PRN trazadone that he can use for sleep, to which he replied, \"Alright.\" He was angry and irritated that we had stopped his prozac, although acknowledged that he had a conversation with Dr. Lugo re: prozac (a trial of stopping prozac to see if side effect of anorgasmia resolved) when reminded.     Many empty food containers were lying throughout room.    The risks, benefits, alternatives and side effects of any medication changes have been discussed and are understood by the patient and other caregivers.    Review of systems:     ROS was negative unless noted above.          Allergies:     Allergies   Allergen Reactions     Mold      Itchy eyes, congestion     Nkda [No Known Drug Allergies]             Psychiatric Examination:   BP (!) 136/95   Pulse 98   Temp 97.9  F (36.6  C)   Resp 16   Ht 1.829 m (6')   SpO2 96%   BMI 42.86 kg/m    Weight is 0 lbs 0 oz  Body mass index is 42.86 kg/m .    Appearance:  dressed in hospital scrubs, appeared as age stated, poorly groomed, BMI = 42.86 and slightly unkempt  Attitude:  evasive, guarded, " uncooperative and sarcastic  Eye Contact:  poor , kept his eyes closed during the entire interaction  Mood:  frustrated  Affect:  mood congruent and intensity is blunted  Speech:  short responses, although longer than yesterday (a few words vs one word answers) somewhat rapid  Psychomotor Behavior:  no evidence of tardive dyskinesia, dystonia, or tics  Thought Process:  linear and illogical at times  Associations:  no loose associations  Thought Content: based on past encounters, no evidence of suicidal ideation or homicidal ideation, no obvious AH/VH.   Insight:  poor  Judgment:  poor  Oriented to:  time, person, and place  Attention Span and Concentration:  poor  Recent and Remote Memory:  limited  Language: speaks fluent english  Fund of Knowledge: Difficult to assess  Muscle Strength and Tone: able to stand and walk appropriately  Gait and Station: Did not assess         Labs:       No results found for this or any previous visit (from the past 24 hour(s)).     Assessment    Diagnostic Impression: Tex Garland is a 39 year old male with a past psychiatric history of schizoaffective disorder depressed type and seasonal affective disorder presented due to paranoia, disorganization, and agitation in the context of medication nonadherence. The patient has no known family history of mental illness. Current social factors include unemployment, lives alone, not having a car or phone, father with Parkinson's/Lewy Body dementia. Psychological factors include previous diagnoses of schizoaffective disorder depressed type and seasonal affective disorders. Biological factors include medication non-compliance. There appears to be a strong biosocial component to his illness. Differential diagnosis include schizophrenia, schizoaffective disorder in the context of medication non-adherence, and substance abuse disorder. Leading diagnosis is schizophrenia given his marked disorganization and paranoia, and lack of  strong mood symptoms and substance use. There does not appear to be a strong mood component to his presentation during this hospitalization, and there haven't been major mood events since his last hospitalization eight years ago, which supports the diagnosis of schizophrenia more than that of schizoaffective disorder.    Hospital course: Tex Garland was admitted to station 22 NB on a 72 hour hold. Restarting aripiprazole or other antipsychotics was discussed with the patient, but he refused, as he attributed anorgasmia to abilify. Team recommended tapering off fluoxetine due to its adverse side effect, but he refused--since he is declining antipsychotics and therapeutic benefit of fluxoetine is uncertain, it was discontinued . Due to the need for apdkbl-st-wirypclho in the past and a history of altercations with strangers, and poor hygiene including rotting food in apartment, there is concern for harm to self or to others. Thus, petition for commitment and nicole was initiated. The pre-petition screener came to talk to him last week, and a court hold is currently in place; we are waiting for commitment and Nicole.    Discontinued Medications (& Rationale):  Fluoxetine- Anorgasmia    Medical course  Tex Garland was brought in by EMS to [facility] ED on 6/19/2019. He was transferred to [facility] for psychiatric evaluation.    Plan   Principal Diagnosis:   # Schizophrenia in the context of medication non-adherence.    Secondary psychiatric diagnoses of concern this admission:     Psychotropic Medications:  Modify: none    Continue:  - Acetaminophen 650 mg PRN for pain  - Albuterol inhaler PRN for wheezing, shortness of breath/dyspnea  - Hydroxyzine 25 mg PRN for anxiety  - Lorazepam 0.5 mg PRN for anxiety  - Olanzapine 10 mg PRN for agitation  - Trazodone 50 mg PRN for sleep    Patient will be treated in therapeutic milieu with appropriate individual and group therapies as described.      Medical  diagnoses to be addressed this admission:   none      Data: see above  Consults: None  Legal Status: Court Hold    Safety Assessment:   Behavioral Orders   Procedures     Code 1 - Restrict to Unit     Elopement precautions     Routine Programming     As clinically indicated     Single Room     Status 15     Every 15 minutes.       Disposition: unknown pending medication management and clinical stabilization    Fannie Sofia, MS3    Psychiatry Attending Attestation: I was present with the medical student who participated in the service and in the documentation of the note. I have verified the history and personally performed the physical exam and medical decision making. I agree with the assessment and plan of care as documented in the note. Teresa Zhang MD      Attestation:  This patient has been seen and evaluated by me, Shawna Lugo.  I have discussed this patient with the house staff team including the resident and medical student and I agree with the findings and plan in this note.    I have reviewed today's vital signs, medications, labs and imaging. Shawna Lugo M.D., Ph.D.

## 2019-06-26 NOTE — PROGRESS NOTES
Patient slept 4.25 hours overnight. Patient unable to remain asleep. Patient had a snack x 2. No problem identified.

## 2019-06-26 NOTE — PROGRESS NOTES
"Patient spent nearly all shift in his room and in bed.  Writer approached patient for a 1:1 and patient denied any mental health issues.  Patient states \"it all is a big misunderstanding\".  Patient denies SI/SIB. Patient ate very little at meal times.  Patient did take a shower this shift.     06/26/19 1400   Behavioral Health   Hallucinations appears responding   Thinking paranoid;distractable;delusional;poor concentration   Orientation situation, disoriented   Memory confabulation   Insight poor;denial of illness   Judgement impaired   Eye Contact out of corner of eyes;at examiner   Affect tense;irritable;angry   Mood anxious;irritable   Physical Appearance/Attire disheveled   Hygiene neglected grooming - unclean body, hair, teeth   1. Wish to be Dead No   2. Non-Specific Active Suicidal Thoughts  No   Activity isolative;refusal   Speech pressured   Psychomotor / Gait balanced;steady   Psycho Education   Type of Intervention structured groups   Response refuses   Activities of Daily Living   Hygiene/Grooming independent  (Declined all)     "

## 2019-06-27 PROCEDURE — 99232 SBSQ HOSP IP/OBS MODERATE 35: CPT | Mod: GC | Performed by: PSYCHIATRY & NEUROLOGY

## 2019-06-27 PROCEDURE — 12400001 ZZH R&B MH UMMC

## 2019-06-27 ASSESSMENT — ACTIVITIES OF DAILY LIVING (ADL)
HYGIENE/GROOMING: PROMPTS
DRESS: INDEPENDENT
LAUNDRY: WITH SUPERVISION
DRESS: INDEPENDENT
ORAL_HYGIENE: PROMPTS
ORAL_HYGIENE: PROMPTS
HYGIENE/GROOMING: PROMPTS

## 2019-06-27 NOTE — PLAN OF CARE
"Tex informed by YONATAN Vu   arrived for transport to court hearing, \"Get the fuck out of here you cock sucker! The  aren't here! I'm not going to court! Get the fuck out of here!\"-Approached by RN-again angrily screamed the  not actually here and he doesn't want to go to court anyway-continues isolative to room-refuses to allow housekeeping in room-Abruptly walked from room to dining area-took uneaten tray (which was not his) despite instruction from staff not to and quickly rushed back to his room to eat it-while rushing down bose yelled at  Peer-retreated to room where he has remained for remaining period of shift, except to briefly come out to demand lunch (which had not arrived) and transport to court hearing-varied responses when door opened for checks, at times tense, but cordial and then angrily yelling to leave him alone-    "

## 2019-06-27 NOTE — PROGRESS NOTES
Patient slept 3 hours overnight. Patient presents as extremely irritable, often yelling at staff when his door is opened for room checks. At one point patient opened his room door and immediately slammed the door shut. Patient glaring at staff when he came out for water. Patient's gait is an angry stomp. Will continue to monitor and assess.

## 2019-06-27 NOTE — PLAN OF CARE
BEHAVIORAL TEAM DISCUSSION    Participants: Dr. Shawna Zhang PGY-1  [facility]  Progress: No improvement   Continued Stay Criteria/Rationale: Patient is in the commitment process and remains on a court hold.   Medical/Physical: No concerns   Precautions:   Behavioral Orders   Procedures    Code 1 - Restrict to Unit    Elopement precautions    Routine Programming     As clinically indicated    Single Room    Status 15     Every 15 minutes.     Plan: Patient has his 1st commitment hearing July 1st. Patient will remain in the hospital for continued safety until stabilized on medication.   Rationale for change in precautions or plan: No change has been indicated.

## 2019-06-27 NOTE — PROGRESS NOTES
"    ----------------------------------------------------------------------------------------------------------  [facility], [place]   Psychiatric Progress Note  Hospital Day #8     Interim History:   The patient's care was discussed with the treatment team and chart notes were reviewed.    Sleep: 3 hrs  Scheduled Medications: None  Staff Report:   Angry, Slamming doors, hasn't showered. Threatend one of the staff with \"a polish surprise.\" Feces smeared on toilet seat.    Patient Interview: Patient was interviewed in his room on 22 NB. He was observed saying, \"Fuck you, you cunt\" to another patient on the floor earlier. He did not want to talk and said that he had to meet with \"the court,\" and immediately thereafter he slammed the door to his room barring our entry. His room was  then yesterday, but still messy with food containers.    The risks, benefits, alternatives and side effects of any medication changes have been discussed and are understood by the patient and other caregivers.    Review of systems:     ROS was negative unless noted above.          Allergies:     Allergies   Allergen Reactions     Mold      Itchy eyes, congestion     Nkda [No Known Drug Allergies]             Psychiatric Examination:   BP (!) (P) 134/92   Pulse (P) 78   Temp (P) 98.1  F (36.7  C)   Resp 16   Ht 1.829 m (6')   SpO2 96%   BMI 42.86 kg/m    Weight is 0 lbs 0 oz  Body mass index is 42.86 kg/m .    Appearance:  appeared as age stated, poorly groomed, BMI = 42.86 and unkempt  Attitude:  evasive, guarded and uncooperative  Eye Contact:  Poor eye contact, although when making eye contact, very intense stare  Mood:  frustrated, verbally combative, abrupt  Affect:  mood congruent and intensity is blunted  Speech:  short responses   Psychomotor Behavior:  no evidence of tardive dyskinesia, dystonia, or tics  Thought Process:  linear and illogical at times  Associations: based on past encounters " no loose associations  Thought Content: based on past encounters, no evidence of suicidal ideation or homicidal ideation, no obvious AH/VH.   Insight:  poor  Judgment:  poor  Oriented to:  time, person, and place  Attention Span and Concentration:  poor  Recent and Remote Memory:  limited  Language: speaks fluent english  Fund of Knowledge: Difficult to assess  Muscle Strength and Tone: able to stand and walk appropriately  Gait and Station: Normal         Labs:       No results found for this or any previous visit (from the past 24 hour(s)).     Assessment    Diagnostic Impression: Tex Garland is a 39 year old male with a past psychiatric history of schizoaffective disorder depressed type and seasonal affective disorder presented due to paranoia, disorganization, and agitation in the context of medication nonadherence. The patient has no known family history of mental illness. Current social factors include unemployment, lives alone, not having a car or phone, father with Parkinson's/Lewy Body dementia. Psychological factors include previous diagnoses of schizoaffective disorder depressed type and seasonal affective disorders. Biological factors include medication non-compliance. There appears to be a strong biosocial component to his illness. Differential diagnosis include schizophrenia, schizoaffective disorder in the context of medication non-adherence, and substance abuse disorder. Leading diagnosis is schizophrenia given his marked disorganization and paranoia, and lack of strong mood symptoms and substance use. There does not appear to be a strong mood component to his presentation during this hospitalization, and there haven't been major mood events since his last hospitalization eight years ago, which supports the diagnosis of schizophrenia more than that of schizoaffective disorder.    Hospital course: Tex Garland was admitted to station 22 NB on a 72 hour hold. Restarting  aripiprazole or other antipsychotics was discussed with the patient, but he refused, as he attributed anorgasmia to abilify. Team recommended tapering off fluoxetine due to its adverse side effect, but he refused--since he is declining antipsychotics and therapeutic benefit of fluxoetine is uncertain, it was discontinued . Due to the need for nirzbb-os-fzqrstpet in the past and a history of altercations with strangers, and poor hygiene including rotting food in apartment, there is concern for harm to self or to others. Thus, petition for commitment and rodriguez was initiated. The pre-petition screener came to talk to him last week, court petition was supported.    Discontinued Medications (& Rationale):  Fluoxetine- Anorgasmia    Medical course  Tex Willis Garland was brought in by EMS to [facility] ED on 6/19/2019. He was transferred to [facility] for psychiatric evaluation.    Plan   Principal Diagnosis:   # Schizophrenia in the context of medication non-adherence.    Secondary psychiatric diagnoses of concern this admission:     Psychotropic Medications:  Modify:    Continue:  - Acetaminophen 650 mg PRN for pain  - Albuterol inhaler PRN for wheezing, shortness of breath/dyspnea  - Hydroxyzine 25 mg PRN for anxiety  - Lorazepam 0.5 mg PRN for anxiety  - Olanzapine 10 mg PRN for agitation  - Trazodone 50 mg PRN for sleep    Patient will be treated in therapeutic milieu with appropriate individual and group therapies as described.      Medical diagnoses to be addressed this admission:   none      Data: see above  Consults: None  Legal Status: Court Hold    Safety Assessment:   Behavioral Orders   Procedures     Code 1 - Restrict to Unit     Elopement precautions     Routine Programming     As clinically indicated     Single Room     Status 15     Every 15 minutes.       Disposition: unknown pending medication management and clinical stabilization    Fannie Sofia, MS3    I was present with the medical student who  participated in the service and in the documentation of the note. I have verified the history and personally performed the physical exam and medical decision making. I agree with the assessment and plan of care as documented in the note. Teresa Zhang MD    Attestation:  This patient has been seen and evaluated by me, Shawna Lugo.  I have discussed this patient with the house staff team including the resident and medical student and I agree with the findings and plan in this note.    I have reviewed today's vital signs, medications, labs and imaging. Shawna Lugo M.D., Ph.D.

## 2019-06-27 NOTE — PROGRESS NOTES
"Pt isolative to room entire shift, came out a couple of times to get snack and briskly walked back to room. On a couple of occasions pt opened door to look up and down hallway, and then retreated back to room and slammed door. Pt very irritable with staff, appears paranoid, questioning their intentions. Writer attempted to do room check in patients room and pt began making vague threats. Pt stated, \"you're going to get a Polish surprise.\" Pt also told writer, \"you have 30 seconds\" and when writer asked what was going to happen in 30 seconds responded, \"you'll see\". Pt also kept stating \"it's happening now.\"  Pt was using much profanity and aggressive tone, and at one point blocked writer from entering further into room. Room search was unable to be completed, but soaking wet linens and food was removed. Appeared that pt had feces smeared on toilet seat, and that there were additional wet clothes and linens all over bathroom floor, but pt would not allow staff to clean room. Attempt was made later in night and pt remained extremely irritable and did not allow staff to enter room. A third attempt was made with a more assertive approach and pt was agreeable and room was cleaned. Pt had very pressured speech when talking with writer, and was unable to make eye contact. Continues to decline shower.      06/26/19 2100   Behavioral Health   Hallucinations appears responding   Thinking delusional;paranoid   Orientation situation, disoriented   Insight denial of illness   Judgement impaired   Eye Contact into space   Affect angry   Mood anxious;irritable   Physical Appearance/Attire disheveled   Hygiene neglected grooming - unclean body, hair, teeth;body odor   Suicidality other (see comments)  (MARY)   1. Wish to be Dead   (MARY)   2. Non-Specific Active Suicidal Thoughts    (MARY )   Self Injury other (see comment)  (MARY)   Activity isolative   Speech pressured   Medication Sensitivity no observed side effects   Psychomotor / " Gait balanced;steady   Psycho Education   Type of Intervention 1:1 intervention   Response refuses   Treatment Detail check in    Group Therapy Session   Group Attendance refused to attend group session   Time Session Began 1700   Time Session Ended 1730   Total Time (minutes) 30   Group Type community   Activities of Daily Living   Hygiene/Grooming prompts   Oral Hygiene prompts   Dress independent   Room Organization prompts

## 2019-06-28 PROCEDURE — 25000132 ZZH RX MED GY IP 250 OP 250 PS 637: Mod: GY | Performed by: STUDENT IN AN ORGANIZED HEALTH CARE EDUCATION/TRAINING PROGRAM

## 2019-06-28 PROCEDURE — 12400001 ZZH R&B MH UMMC

## 2019-06-28 PROCEDURE — 99232 SBSQ HOSP IP/OBS MODERATE 35: CPT | Mod: GC | Performed by: PSYCHIATRY & NEUROLOGY

## 2019-06-28 RX ORDER — LORAZEPAM 1 MG/1
1 TABLET ORAL 2 TIMES DAILY PRN
Status: DISCONTINUED | OUTPATIENT
Start: 2019-06-28 | End: 2019-07-30 | Stop reason: HOSPADM

## 2019-06-28 RX ORDER — OLANZAPINE 10 MG/2ML
10 INJECTION, POWDER, FOR SOLUTION INTRAMUSCULAR ONCE
Status: DISCONTINUED | OUTPATIENT
Start: 2019-06-28 | End: 2019-06-28

## 2019-06-28 RX ORDER — OLANZAPINE 10 MG/2ML
10 INJECTION, POWDER, FOR SOLUTION INTRAMUSCULAR ONCE
Status: COMPLETED | OUTPATIENT
Start: 2019-06-29 | End: 2019-06-29

## 2019-06-28 RX ORDER — OLANZAPINE 10 MG/1
10 TABLET ORAL ONCE
Status: DISCONTINUED | OUTPATIENT
Start: 2019-06-28 | End: 2019-06-28

## 2019-06-28 RX ORDER — OLANZAPINE 10 MG/1
10 TABLET ORAL ONCE
Status: COMPLETED | OUTPATIENT
Start: 2019-06-29 | End: 2019-06-29

## 2019-06-28 RX ADMIN — LORAZEPAM 0.5 MG: 0.5 TABLET ORAL at 13:00

## 2019-06-28 RX ADMIN — OLANZAPINE 10 MG: 10 TABLET, FILM COATED ORAL at 13:00

## 2019-06-28 ASSESSMENT — ACTIVITIES OF DAILY LIVING (ADL)
DRESS: INDEPENDENT
HYGIENE/GROOMING: PROMPTS
LAUNDRY: UNABLE TO COMPLETE
HYGIENE/GROOMING: PROMPTS
LAUNDRY: UNABLE TO COMPLETE
DRESS: INDEPENDENT
ORAL_HYGIENE: PROMPTS
ORAL_HYGIENE: PROMPTS

## 2019-06-28 NOTE — PROGRESS NOTES
"Rec'd pt. wakeful standing in room as his bedside as shift commenced;  Observed to be standing in the same spot over the next 2 hrs.  Afterwards pt. was noted to open his room door, look down the halls and then would move back in to  His room and loudly slam his room door. Unprovoked anger and agitation when door was opened for rounding making sometimes incomprehendible comments which would keep trailing after his door was again closed.  At one point[facility] and empty containers.  Began to fill the containers with water.  When questioned as to his intent and offered assist, pt. became quite angry w/  very loud  responding \"why in the fuck are you asking  me that;  Fuck you, I'm going to take a fucking shower\".  Begrudgingly threw a couple of the empty containers in the trash, then hurriedly stomped down the bose back to his room again slamming his door.  Would intermittently lie down with papers in his hands as if reading them.  At one point removed his upper torso clothing and was walking in circles in his room.  Approx. an hr later had removed all of his clothing and continued circling his room.  When this staff made rounds, pt. screamed angrily and ran toward the bathroom.  Continued w/ angry comments for a very brief time.   A short time later pt. could be heard w/ banging like noise coming form his room.  Upon observation, pt. had re -dressed, was walking around his room.  Admitted that he was banging but agreed that it was not appropriate.  Was asked to remove 2 food trays which he had placed in the room door entrance.  Remarked I'm not going to remove them but you can but you know what that means - motioning as if the trays were a no- entry barrier to his room.  He then thanked the staff and remarked \"I appreciate it\".  Was asked not to slam his door behind staff to which he complied. Sitting quietly on his bed as of 0645.  Monitored closely;  Safe, therapeutic environment " maintained though appears to be continually on the behavioral edge.

## 2019-06-28 NOTE — PLAN OF CARE
"Tex rushed down bose to bkft cart this AM-swearing and making inappropriate statements on the way, including inappropriate complaints that \"She fucking won't even have sex with me.\"-redirected back to his room where he slammed door several times-tray brought to Tex's room-continued angry and sarcastic, but remained in room-briefly fell asleep late AM-post waking again angry and sarcastic-began slamming plate on window sill-states this is a ritual he must engage in-seen by Parish who discussed benefits of medication-continued to refuse meds, but agreed to refrain from yelling at peers or slamming items in his room-almost immediately after agreement began repeatedly slamming door to room-when redirected began to forcefully hit window sill with plate stating this is a ritualistic behavior he must engage in-code green called-arms held by Marky GALVAN and Neil FORDE while Tex in sitting position on his bed-accepted Zyprexa 10 mg PO and Ativan 0.5 mg PO at 1200-Tex able to visit with mom 1400 without incident-Mom pleased medication has been initiated-some sarcasm, but no angry outbursts for remaining shift   "

## 2019-06-28 NOTE — PROGRESS NOTES
"Pt spent shift in their room.  Writer asked pt multiple times if I could remove pt's dirty trays, pt refused every attempt stating that \"someone should, but not you\".  Pt had visitors and was upset with them.  After 5 - 10 minutes pt sent them away.  Pt was brought dinner in room, pt welcomed writer with \"get out\".  After being informed that writer brought dinner pt became very thankful and appreciative.  Pt spent entire shift in their room.  No shower.        "

## 2019-06-28 NOTE — PROGRESS NOTES
"    ----------------------------------------------------------------------------------------------------------  [facility], [place]   Psychiatric Progress Note  Hospital Day #9     Interim History:   The patient's care was discussed with the treatment team and chart notes were reviewed.    Sleep: 0  Scheduled Medications: none  Staff Report: patient spent most of evening in his room. During the night, he stood in one spot for ~2 hrs, came out to the lounge and filled up empty food containers with water, and when asked why, he was angry and loud stating, \"Fuck you I'm going to take a shower.\" Walked in circles in room with a without clothes on. Slammed door throughout evening, yelled and cursed at other peers.    Patient Interview: Patient was interviewed from the entrance of his room. Yelled, \"this is not a good time.\" He was asked to not yell or swear at other patients, and to not slam door, to which he agreed and shut door. He was offered oral ativan and/or zyprexa, which he refused.    About a minute later, he began to slam his toilet seat, door, and lunch plate. He was agitated and verbally threatening staff with posturing. A code was called, and he was given oral ativan and zyprexa, after calling staff \"kasi.\" He did not require restraint or seclusion.     The risks, benefits, alternatives and side effects of any medication changes have been discussed and are understood by the patient and other caregivers.    Review of systems:     ROS was negative unless noted above.          Allergies:     Allergies   Allergen Reactions     Mold      Itchy eyes, congestion     Nkda [No Known Drug Allergies]             Psychiatric Examination:   BP (!) 134/92   Pulse 78   Temp 98.1  F (36.7  C)   Resp 16   Ht 1.829 m (6')   SpO2 96%   BMI 42.86 kg/m    Weight is 0 lbs 0 oz  Body mass index is 42.86 kg/m .    Appearance:  awake, alert and poorly groomed  Attitude:  uncooperative  Eye " "Contact:  poor   Mood: \"pissed off\"  Affect:  mood congruent, blunted affect  Speech: loud, short sentences  Psychomotor Behavior:  no evidence of tardive dyskinesia, dystonia, or tics  Thought Process:  disorganized  Associations:  loosening of associations present  Thought Content:  no evidence of suicidal ideation or homicidal ideation, paranoia  Insight:  poor  Judgment:  poor  Attention Span and Concentration:  limited  Recent and Remote Memory:  limited  Language: speaks fluent english  Gait and Station: Normal         Labs:   No results found for this or any previous visit (from the past 24 hour(s)).       Assessment    Diagnostic Impression: Tex Garland is a 39 year old male with a past psychiatric history of schizoaffective disorder depressed type and seasonal affective disorder presented due to paranoia, disorganization, and agitation in the context of medication nonadherence. The patient has no known family history of mental illness. Current social factors include unemployment, lives alone, not having a car or phone, father with Parkinson's/Lewy Body dementia. Psychological factors include previous diagnoses of schizoaffective disorder depressed type and seasonal affective disorders. Biological factors include medication non-compliance. There appears to be a strong biosocial component to his illness. Differential diagnosis include schizophrenia, schizoaffective disorder in the context of medication non-adherence, and substance abuse disorder. Leading diagnosis is schizophrenia given his marked disorganization and paranoia, and lack of strong mood symptoms and substance use. There does not appear to be a strong mood component to his presentation during this hospitalization, and there haven't been major mood events since his last hospitalization eight years ago, which supports the diagnosis of schizophrenia more than that of schizoaffective disorder.     Hospital course: Tex Pedro " Dada was admitted to station 22 NB on a 72 hour hold. Restarting aripiprazole or other antipsychotics was discussed with the patient, but he refused, as he attributed anorgasmia to abilify. Team recommended tapering off fluoxetine due to its adverse side effect, but he refused--since he is declining antipsychotics and therapeutic benefit of fluxoetine is uncertain, it was discontinued . Due to the need for gxuyqs-iu-xckkkasab in the past and a history of altercations with strangers, and poor hygiene including rotting food in apartment, there is concern for harm to self or to others. Thus, petition for commitment and rodriguez was initiated. The pre-petition screener came to talk to him last week, and a court hold is currently in place; we are waiting for commitment and Rodriguez. On 6/28 a code was called for patient's worsening agitation (yelling at and swearing at peers, slamming doors, slamming plate in room, slamming toilet seat in room); he took oral olanzapine and ativan, and afterward was calm in his room.      Discontinued Medications (& Rationale):  Fluoxetine- Anorgasmia     Medical course  Tex Willis Garland was brought in by EMS to [facility] ED on 6/19/2019. He was transferred to [facility] for psychiatric evaluation.    Plan   Principal Diagnosis:   # schizophrenia in the context of medication non-adherence     Secondary psychiatric diagnoses of concern this admission: none    Psychotropic Medications:  Modify:  24 hour emergency oral olanzapine (10mg) and ativan (1mg) OR IM olanzapine    Continue:  - Acetaminophen 650 mg PRN for pain  - Albuterol inhaler PRN for wheezing, shortness of breath/dyspnea  - Hydroxyzine 25 mg PRN for anxiety  - Lorazepam 0.5 mg PRN for anxiety  - Olanzapine 10 mg PRN for agitation  - Trazodone 50 mg PRN for sleep    Patient will be treated in therapeutic milieu with appropriate individual and group therapies as described.      Medical diagnoses to be addressed this admission:     none    Data: see above  Consults: none    Legal Status: Court Hold    Safety Assessment:   Behavioral Orders   Procedures     Code 1 - Restrict to Unit     Elopement precautions     Routine Programming     As clinically indicated     Single Room     Status 15     Every 15 minutes.       Disposition: unknown pending medication management and clinical stabilization    Teresa Zhang MD   PGY-1    Attestation:  This patient has been seen and evaluated by me, Shawna Lugo.  I have discussed this patient with the house staff team including the resident and medical student and I agree with the findings and plan in this note.    I have reviewed today's vital signs, medications, labs and imaging. Shawna Lugo M.D., Ph.D.

## 2019-06-29 PROCEDURE — 12400001 ZZH R&B MH UMMC

## 2019-06-29 PROCEDURE — 25000132 ZZH RX MED GY IP 250 OP 250 PS 637: Mod: GY | Performed by: STUDENT IN AN ORGANIZED HEALTH CARE EDUCATION/TRAINING PROGRAM

## 2019-06-29 RX ORDER — OLANZAPINE 10 MG/1
10 TABLET ORAL ONCE
Status: COMPLETED | OUTPATIENT
Start: 2019-06-30 | End: 2019-06-30

## 2019-06-29 RX ORDER — OLANZAPINE 10 MG/2ML
10 INJECTION, POWDER, FOR SOLUTION INTRAMUSCULAR ONCE
Status: COMPLETED | OUTPATIENT
Start: 2019-06-30 | End: 2019-06-30

## 2019-06-29 RX ADMIN — OLANZAPINE 10 MG: 10 TABLET, FILM COATED ORAL at 21:46

## 2019-06-29 RX ADMIN — LORAZEPAM 1 MG: 1 TABLET ORAL at 21:46

## 2019-06-29 ASSESSMENT — ACTIVITIES OF DAILY LIVING (ADL)
DRESS: INDEPENDENT
ORAL_HYGIENE: INDEPENDENT
LAUNDRY: UNABLE TO COMPLETE
HYGIENE/GROOMING: INDEPENDENT

## 2019-06-29 NOTE — PROGRESS NOTES
"Rec'd pt. sleeping w/o any observable distress as shift commenced.  Observed to have slept till approx 0315 at which time pt. awakened for toileting. Upon making rounds during this time, pt.  heard his door open and screamed at this staff.  \"What, What, what are you doing ?  I'm on the fucking toilet\" (repeated several times).  Within a minute or 2 pt. was heard slamming his room door very loudly, then  returned to his bed.  Environment conducive to pt's safety and well-being.  Was observed lying quietly on his bed though wakeful till falling asleep again at approx 0530.  Respirations regular and unlabored.  Continuing to monitor.     "

## 2019-06-29 NOTE — PLAN OF CARE
"Tex slammed his door about three times when breakfast arrived.  This signals that he may want his breakfast brought to him.  When offered his medications he said, \"I don't want any f------ Prozac.\".  He was loud, profane and began mocking the male staff  member who brought his tray.  Hard to hear words other than profanity except that he screwed up his face and spoke  in a sing song high pitched voice to mock.  When given feedback that his language was not appreciated he said, remarkable, \"Yes you make a good point.\".  He was polite suddenly, allowed female staff to take vs without mocking or abusive language.  Before he sat down to have vital signs taken he roughly slid the breakfast tray off the extra bed and onto the floor.  He then sat down and respectfully allowed vital signs taken.  His mother, Kirk visited later and he was heard using very rough language and yelling loudly, \"Get out.  Get Out.\".  His mother said it was ok that he had quickly settled to calm again.  She then came out to desk and said,  \"I told him that I would leave if he talked to me with profanity.  He then said, \"Oh mom, thank you for visiting.\" in a mild and sincere tone (per mother).  She said she would stay for 5 minutes but could not before suddenly, \"When I said something he didn't like he would start yelling and swearing again.  Then again, become calm and give her compliment.  \"He is very disorganized,\" said Kirk, \"Usually he is very respectful and kind.  This is hard.  I think really it is such an ordeal for him, and hard on me too, to go to court.  He is usually handcuffed and it is hard on all of us.  If he wants to stay back from court and let proceedings go on that is fine with me.  If he goes to court, I want to be there so please have someone call me.  It will probably be the same outcome if he goes or not.\".    In general, Tex has had 4 to 5 times of door slamming, swearing, intimidating behavior but much less than " "yesterday and with less repetition and intensity.  His mother said, \"I have never seen these rituals that he is doing now before.\".      "

## 2019-06-29 NOTE — PROGRESS NOTES
Pt presents angry, paranoid delusional. Isolative, refused all groups. Freq verbally aggressive, threats and profanity. Meals brought to his room. At times, stares at meals for hours. No shower.     06/29/19 1400   Behavioral Health   Hallucinations appears responding   Thinking paranoid;delusional   Orientation situation, disoriented   Insight denial of illness   Judgement impaired   Eye Contact staring;into space   Affect angry;irritable;tense   Mood irritable;anxious   Physical Appearance/Attire disheveled   1. Wish to be Dead No   2. Non-Specific Active Suicidal Thoughts  No   Activity refusal;isolative   Speech pressured;clear   Psychomotor / Gait balanced;steady;paces   Overt Aggression Scale   Verbal Aggression 2   Aggression against Property 2   Auto-Aggression 0   Physical Aggression 0   Overt Aggression Total Score 4   Psycho Education   Type of Intervention structured groups   Response refuses   Activities of Daily Living   Hygiene/Grooming   (refused all)

## 2019-06-29 NOTE — PLAN OF CARE
"Tex had a tough day, requiring emergency medications. Tonight, he is sedated, sleeping on bed. He allowed this nurse to remove to food trays from his room. Second tray not touched. He declines a 1:1 with this nurse. Requested blankets and sheets which were delivered, but he did not awaken. He was quite irritable when this nurse commented that he hadn't eaten his dinner. Said \"It's only 5:30,\" (added some swear words)  "

## 2019-06-30 PROCEDURE — 25000132 ZZH RX MED GY IP 250 OP 250 PS 637: Mod: GY | Performed by: STUDENT IN AN ORGANIZED HEALTH CARE EDUCATION/TRAINING PROGRAM

## 2019-06-30 PROCEDURE — 12400001 ZZH R&B MH UMMC

## 2019-06-30 RX ADMIN — OLANZAPINE 10 MG: 10 TABLET, FILM COATED ORAL at 22:35

## 2019-06-30 RX ADMIN — LORAZEPAM 1 MG: 1 TABLET ORAL at 22:36

## 2019-06-30 ASSESSMENT — ACTIVITIES OF DAILY LIVING (ADL)
ORAL_HYGIENE: INDEPENDENT
LAUNDRY: WITH SUPERVISION
DRESS: INDEPENDENT
HYGIENE/GROOMING: INDEPENDENT

## 2019-06-30 NOTE — PROGRESS NOTES
"Pt spent shift in and out of room.  Early in shift, when writer introduced self to pt, he repeated words in a mocking tone.  When writer left, pt replied \"close the fucking door.\" Throughout day pt would come out of room and walk down to the lounge and slam the door three times each time he left and returned.  Occasionally he would raise his voice to others, including the incident that was charted about by .      When it was time to give pt PM medications, due to behaviors throughout the day and upon recommendation by day staff, a code 21 was called.  When staff arrived, pt was approached by this writer and JH and informed that he would need to take his meds orally or get an injection.  The pt initially refused to take meds orally.  Staff went hands on and held pt's arms as he sat on bed.  Writer approached pt with injection and pt said he would take meds orally.  Pt took oral medications without complication.  There was no need for seclusion or restraint.    Pt was not observed to shower and he has strong body odor.  Pt's room is very messy and disorganized.      Pt's mother called to check on pt's progress and medication compliance.  Mother provided information about pt's behavior.  She stated  \"worst I've seen him\", referring to her visit on the day shift.  She said she's seen times when he's acted somewhat angry/agitated, but \"never before like this.\"    "

## 2019-06-30 NOTE — PROGRESS NOTES
"Pt came out of room to get snacks.  Pt yelled at pts and staff saying \"don't call me honey\".  Pt then walked back to room saying \"cunt, bitch, and fuck\".  When asked to not swear at others, pt ignored staff.  Pt slammed door when entering room.  When asked to not slam the door.  Pt said \"sorry, okay\".        "

## 2019-06-30 NOTE — PROGRESS NOTES
Patient slept 4.75 hours overnight. Received patient stomping angrily down the hallway to get water. Patient returned to room for a few minutes before stomping back down the hallway to get pencils from the workstation. Patient returned to room and loudly banged his door shut. Patient then proceeded to curse loudly in room for an extended period of time. Patient could also be heard responding to the voices.  Patient finally sleeping @ 0145.Will continue to monitor and assess.

## 2019-06-30 NOTE — PROGRESS NOTES
06/30/19 1215   Behavioral Health   Hallucinations appears responding   Thinking paranoid;poor concentration;delusional   Orientation place, disoriented;person, disoriented   Memory new learning, recall loss   Insight poor   Judgement impaired   Eye Contact at examiner   Affect angry   Mood labile;irritable   Physical Appearance/Attire disheveled   Hygiene body odor   Suicidality other (see comments)  (MARY)   1. Wish to be Dead No   2. Non-Specific Active Suicidal Thoughts  No   3. Active Sucidal Ideation with any Methods (Not Plan) Without Intent to Act  No   4. Active Suicidal Ideation with Some Intent to Act, Without Specific Plan  No   5. Active Suicidal Ideation with Specific Plan and Intent  No   Duration (Lifetime) NA   Change in Protective Factors? No   Enviromental Risk Factors None   Self Injury other (see comment)  (none observed )   Elopement Distress about legal situation (holds for mental health or criminal)   Activity refusal;isolative   Speech pressured;rambling   Medication Sensitivity no stated side effects;no observed side effects   Psychomotor / Gait paces;tremors   Overt Aggression Scale   Verbal Aggression 2   Aggression against Property 2   Auto-Aggression 0   Physical Aggression 0   Overt Aggression Total Score 4   PT Continue to cuss at staff and refuses to clean body or hair. He also continues to slam the door four to five times before he shuts it for good. Pt also seems shaky when standing and Pt also seem to be responding to voices in his head.

## 2019-07-01 PROCEDURE — 25000132 ZZH RX MED GY IP 250 OP 250 PS 637: Mod: GY | Performed by: STUDENT IN AN ORGANIZED HEALTH CARE EDUCATION/TRAINING PROGRAM

## 2019-07-01 PROCEDURE — 12400001 ZZH R&B MH UMMC

## 2019-07-01 PROCEDURE — 99232 SBSQ HOSP IP/OBS MODERATE 35: CPT | Mod: GC | Performed by: PSYCHIATRY & NEUROLOGY

## 2019-07-01 RX ORDER — OLANZAPINE 10 MG/1
10 TABLET, ORALLY DISINTEGRATING ORAL AT BEDTIME
Status: DISCONTINUED | OUTPATIENT
Start: 2019-07-01 | End: 2019-07-02

## 2019-07-01 RX ORDER — OLANZAPINE 10 MG/1
10 TABLET ORAL ONCE
Status: DISCONTINUED | OUTPATIENT
Start: 2019-07-01 | End: 2019-07-01

## 2019-07-01 RX ORDER — OLANZAPINE 10 MG/2ML
10 INJECTION, POWDER, FOR SOLUTION INTRAMUSCULAR AT BEDTIME
Status: DISCONTINUED | OUTPATIENT
Start: 2019-07-01 | End: 2019-07-02

## 2019-07-01 RX ORDER — OLANZAPINE 10 MG/2ML
10 INJECTION, POWDER, FOR SOLUTION INTRAMUSCULAR ONCE
Status: DISCONTINUED | OUTPATIENT
Start: 2019-07-01 | End: 2019-07-01

## 2019-07-01 RX ADMIN — LORAZEPAM 1 MG: 1 TABLET ORAL at 21:49

## 2019-07-01 RX ADMIN — OLANZAPINE 10 MG: 10 TABLET, ORALLY DISINTEGRATING ORAL at 21:49

## 2019-07-01 ASSESSMENT — ACTIVITIES OF DAILY LIVING (ADL)
HYGIENE/GROOMING: PROMPTS
ORAL_HYGIENE: PROMPTS
DRESS: INDEPENDENT
LAUNDRY: WITH SUPERVISION
HYGIENE/GROOMING: PROMPTS

## 2019-07-01 NOTE — PROGRESS NOTES
"    ----------------------------------------------------------------------------------------------------------  [facility], [place]   Psychiatric Progress Note  Hospital Day #12     Interim History:   The patient's care was discussed with the treatment team and chart notes were reviewed.    Sleep: 2.5hrs (6/30), 4.75hrs (6/29), 4.75hrs (6/28)  Scheduled Medications: 24 hour emergency olanzapine 10mg + 1mg ativan given on 6/28 and 6/29    Staff Report: Patient slammed door throughout weekend, slamming door in the morning may be associated with wanting breakfast tray brought to him(?). Fluctuated between mocking or abusive language with staff and being polite. Mom visited on weekend, reported that this is the worst she has seen him, asked if someone would call her if Tex decides to go to court. Required emergency medications this weekend for safety, did require hands-on on 6/29 before patient agreed to take oral medication. Cursed loudly in room during 6/30 evening, appeared responding to voices. Attempted to shower with cups of water from the water machine, explained that shower in room was too small. Became hostile when a staff member picked up a piece of paper with writing on it in his room.     Patient Interview: Patient was interviewed from the doorway of his room. He was laying in bed reading/writing in a journal, and did not turn toward writer or make eye contact. Gave short responses: \"yeah, sure\" in a mocking tone was the response to most questions. Said that he wanted food (around 12pm), and when told that lunch would be here soon, he said, \"I'm not talking about fucking lunch...breakfast!\" and ended the conversation.     The risks, benefits, alternatives and side effects of any medication changes have been discussed and are understood by the patient and other caregivers.    Review of systems:     ROS was negative unless noted above.          Allergies:     Allergies   Allergen " "Reactions     Mold      Itchy eyes, congestion     Nkda [No Known Drug Allergies]             Psychiatric Examination:   /88   Pulse 88   Temp 98.1  F (36.7  C)   Resp 16   Ht 1.829 m (6')   SpO2 96%   BMI 42.86 kg/m    Weight is 0 lbs 0 oz  Body mass index is 42.86 kg/m .    Appearance:  poorly groomed, items of used food packages laying on bed  Attitude:  evasive and guarded  Eye Contact:  No eye contact throughout conversation, looking at journal the whole time  Mood:  \"fine\"  Affect:  mood incongruent, intensity is blunted and constricted mobility; speaks in loud, mocking, tone at baseline, and then raises voice when frustrated  Speech:  clear, coherent  Psychomotor Behavior:  no evidence of tardive dyskinesia, dystonia, or tics  Thought Process:  disorganized  Associations:  difficult to assess, given patient's short answers  Thought Content:  no evidence of suicidal ideation or homicidal ideation and patient appears to be responding to internal stimuli  Insight:  poor  Judgment:  poor  Language: speaks fluent english         Labs:   No results found for this or any previous visit (from the past 24 hour(s)).       Assessment    Diagnostic Impression: Tex Garland is a 39 year old male with a past psychiatric history of schizoaffective disorder depressed type and seasonal affective disorder presented due to paranoia, disorganization, and agitation in the context of medication nonadherence. The patient has no known family history of mental illness. Current social factors include unemployment, lives alone, not having a car or phone, father with Parkinson's/Lewy Body dementia. Psychological factors include previous diagnoses of schizoaffective disorder depressed type and seasonal affective disorders. Biological factors include medication non-compliance. There appears to be a strong biosocial component to his illness. Differential diagnosis include schizophrenia, schizoaffective disorder in " the context of medication non-adherence, and substance abuse disorder. Leading diagnosis is schizophrenia given his marked disorganization and paranoia, and lack of strong mood symptoms and substance use. There does not appear to be a strong mood component to his presentation during this hospitalization, and there haven't been major mood events since his last hospitalization eight years ago, which supports the diagnosis of schizophrenia more than that of schizoaffective disorder.    Hospital course: Tex Garland was admitted to station [facility] NB on a 72 hour hold. Restarting aripiprazole or other antipsychotics was discussed with the patient, but he refused, as he attributed anorgasmia to abilify. Team recommended tapering off fluoxetine due to its adverse side effect, but he refused--since he is declining antipsychotics and therapeutic benefit of fluxoetine is uncertain, it was discontinued . Due to the need for [id number] in the past and a history of altercations with strangers, and poor hygiene including rotting food in apartment, there is concern for harm to self or to others. Thus, petition for commitment and nicole was initiated. The pre-petition screener came to talk to him last week, and a court hold is currently in place; we are waiting for commitment and Nicole. On 6/28 a code was called for patient's worsening agitation (yelling at and swearing at peers, slamming doors, slamming plate in room, slamming toilet seat in room); he took oral olanzapine and ativan, and afterward was calm in his room. He required emergency forced medication (oral olanzapine and ativan) on 6/29 and 6/30 for continued disorganization and agitation.     Given the patients continued severe symptoms and concerns for safety,  The patient will be evaluated for the need for neuroleptic medication on an emergency basis daily until he improves or nicole order is received.     Discontinued Medications (&  Rationale):  Fluoxetine- Anorgasmia    Medical course: Txe Willis Garland was brought in by EMS to [facility] ED on 6/19/2019. He was transferred to [facility] for psychiatric evaluation.    Plan   Principal Diagnosis:   # schizophrenia in the context of medication non-adherence      Secondary psychiatric diagnoses of concern this admission: none     Psychotropic Medications:  Modify:  24 hour emergency oral olanzapine (10mg) and ativan (1mg) OR IM olanzapine     Continue:  - Acetaminophen 650 mg PRN for pain  - Albuterol inhaler PRN for wheezing, shortness of breath/dyspnea  - Hydroxyzine 25 mg PRN for anxiety  - Lorazepam 0.5 mg PRN for anxiety  - Olanzapine 10 mg PRN for agitation  - Trazodone 50 mg PRN for sleep     Patient will be treated in therapeutic milieu with appropriate individual and group therapies as described.        Medical diagnoses to be addressed this admission:    none     Data: see above  Consults: none    Legal Status: Court Hold    Safety Assessment:   Behavioral Orders   Procedures     Code 1 - Restrict to Unit     Elopement precautions     Routine Programming     As clinically indicated     Sexual precautions     Single Room     Status 15     Every 15 minutes.       Disposition: unknown pending medication management and clinical stabilization     Teresa Zhang MD   PGY-1      Attestation:  This patient has been seen and evaluated by me, Shawna Lugo.  I have discussed this patient with the house staff team including the resident and medical student and I agree with the findings and plan in this note.    I have reviewed today's vital signs, medications, labs and imaging. Shawna Lugo M.D., Ph.D.

## 2019-07-01 NOTE — PROGRESS NOTES
PT has only slept 30 min thus far. PT was banging on the wall and needed to be redirected.  PT almost constantly responding in room.  PT opened door x2 and slammed x2.  PT appears to be on edge but able to maintain control.  PT reading and writing at times in room.  PT turned off light and slept for half hour before turning light back on.  PT thereafter returned to his responding and then reading and writing. PT appears to be less labile than on previous nights.

## 2019-07-01 NOTE — PLAN OF CARE
"Tex spent entire day in room-often in bed and assuming odd postures with arms curved into same position for extended period of time-annoyed when interrupted by staff-declines 1:1 yelling at staff, \"Get out of my room. \" at same time thanking staff for bringing or taking his tray-no door slamming or pounding today   "

## 2019-07-01 NOTE — PROGRESS NOTES
"Pt was in and out of room.  Pt comes out of room, will occasionally slam door behind him, and when confronted pt will either apologize or deny they slammed the door.  Pt attempted to fill up over 10 cups of water from water machine stating they were going to shower with them.  Pt was reminded that we have showers, pt stated the showers are to small.  Pt compromised with 6 cups of water and a basin to use as sponge bath in their room.  Writer and another staff both entered pts room to attempt to clean it.  During the cleaning process pt made many remarks about how staff should not be cleaning room.  When asked if pt would allow housekeeping in to clean their room, pt stated no.  During the cleaning staff got close to a piece of paper on floor with writing on it.  At that time pt lept out of bed, charged and postured at staff stating \"Don't touch that, we all saw you touch that\".  Staff was able to verbally deescalate pt.  Pt ate dinner and snacks.  Pt did not attend OT.  Pt denies SI and SIB.         06/30/19 2139   Behavioral Health   Hallucinations appears responding   Thinking distractable;delusional;paranoid   Memory new learning, recall loss   Insight poor   Judgement impaired   Eye Contact at examiner   Affect angry;tense   Mood labile;irritable   Physical Appearance/Attire disheveled   Hygiene body odor;neglected grooming - unclean body, hair, teeth   1. Wish to be Dead No   2. Non-Specific Active Suicidal Thoughts  No   Elopement Distress about legal situation (holds for mental health or criminal)   Activity isolative;refusal   Speech pressured;rambling   Psychomotor / Gait paces;tremors   Psycho Education   Type of Intervention 1:1 intervention   Response observes from a distance   Activities of Daily Living   Hygiene/Grooming independent   Oral Hygiene independent   Dress independent   Laundry with supervision   Room Organization independent     "

## 2019-07-02 PROCEDURE — 25000132 ZZH RX MED GY IP 250 OP 250 PS 637: Mod: GY | Performed by: STUDENT IN AN ORGANIZED HEALTH CARE EDUCATION/TRAINING PROGRAM

## 2019-07-02 PROCEDURE — 99232 SBSQ HOSP IP/OBS MODERATE 35: CPT | Mod: GC | Performed by: PSYCHIATRY & NEUROLOGY

## 2019-07-02 PROCEDURE — 12400001 ZZH R&B MH UMMC

## 2019-07-02 RX ORDER — OLANZAPINE 10 MG/1
10 TABLET, ORALLY DISINTEGRATING ORAL ONCE
Status: COMPLETED | OUTPATIENT
Start: 2019-07-02 | End: 2019-07-02

## 2019-07-02 RX ORDER — OLANZAPINE 10 MG/2ML
10 INJECTION, POWDER, FOR SOLUTION INTRAMUSCULAR ONCE
Status: COMPLETED | OUTPATIENT
Start: 2019-07-02 | End: 2019-07-02

## 2019-07-02 RX ADMIN — LORAZEPAM 1 MG: 1 TABLET ORAL at 18:34

## 2019-07-02 RX ADMIN — OLANZAPINE 10 MG: 10 TABLET, ORALLY DISINTEGRATING ORAL at 20:48

## 2019-07-02 ASSESSMENT — ACTIVITIES OF DAILY LIVING (ADL)
HYGIENE/GROOMING: PROMPTS
HYGIENE/GROOMING: PROMPTS
LAUNDRY: UNABLE TO COMPLETE
DRESS: INDEPENDENT
ORAL_HYGIENE: PROMPTS

## 2019-07-02 NOTE — PROGRESS NOTES
"Tex's mother visited earlier this dominique. She related to staff that client was upset with a female nurse who had called him an endearing name, such as \"jose,\" Tex was quite upset over this, telling his mother that he thought the nurse was sexually infatuated with him. Unsure of identity of the nurse.  "

## 2019-07-02 NOTE — PROGRESS NOTES
"    ----------------------------------------------------------------------------------------------------------  [facility], [place]   Psychiatric Progress Note  Hospital Day #13     Interim History:   The patient's care was discussed with the treatment team and chart notes were reviewed.    Sleep: 6.5hrs (increased from previous days)  Scheduled Medications: taken as prescribed    Staff Report: Spent entire day in room, often in bed assuming odd positions with arms for extended periods of time. No door slamming or pounding. Room disheveled, tense/irritable. Did aggressively bump into a peer in the hallway.     Patient Interview: [place] from [place], patient laying in his bed. He stated that he wants his \"fucking breakfast,\" when asked if there was anything we could do for him. When informing him that he had court today, he denied this, \"the  isn't coming.\"      The risks, benefits, alternatives and side effects of any medication changes have been discussed and are understood by the patient and other caregivers.    Review of systems:     ROS was negative unless noted above.          Allergies:     Allergies   Allergen Reactions     Mold      Itchy eyes, congestion     Nkda [No Known Drug Allergies]             Psychiatric Examination:   BP (!) 155/93 (BP Location: Right arm)   Pulse 106   Temp 97.6  F (36.4  C) (Oral)   Resp 16   Ht 1.829 m (6')   SpO2 97%   BMI 42.86 kg/m    Weight is 0 lbs 0 oz  Body mass index is 42.86 kg/m .    Appearance:  poorly groomed, holding journal in his hands, reading it  Attitude:  evasive and guarded  Eye Contact:  Minimal eye contact throughout interview (although more than the total lack of eye contact yesterday)  Mood:  \"great\"  Affect:  mood incongruent, intensity is blunted and constricted mobility; speaks in loud, mocking, tone at baseline, and then raises voice when frustrated  Speech:  clear, coherent  Psychomotor Behavior:  no " evidence of tardive dyskinesia, dystonia, or tics  Thought Process:  disorganized  Associations:  difficult to assess, given patient's short answers  Thought Content:  no evidence of suicidal ideation or homicidal ideation and patient appears to be responding to internal stimuli  Insight:  poor  Judgment:  poor  Language: speaks fluent english         Labs:   No results found for this or any previous visit (from the past 24 hour(s)).       Assessment    Diagnostic Impression: Tex Garland is a 39 year old male with a past psychiatric history of schizoaffective disorder depressed type and seasonal affective disorder presented due to paranoia, disorganization, and agitation in the context of medication nonadherence. The patient has no known family history of mental illness. Current social factors include unemployment, lives alone, not having a car or phone, father with Parkinson's/Lewy Body dementia. Psychological factors include previous diagnoses of schizoaffective disorder depressed type and seasonal affective disorders. Biological factors include medication non-compliance. There appears to be a strong biosocial component to his illness. Likely diagnosis is schizophrenia given his marked disorganization and paranoia, and lack of strong mood symptoms (other than irritability), although difficult to assess mood symptoms given patient's extreme guardedness and agitation.     Hospital course: Tex Garland was admitted to station 22 NB on a 72 hour hold. Restarting aripiprazole or other antipsychotics was discussed with the patient, but he refused, as he attributed anorgasmia to abilify. Team recommended tapering off fluoxetine due to its adverse side effect, but he refused--since he is declining antipsychotics and therapeutic benefit of fluxoetine is uncertain, it was discontinued . Due to the need for ekewan-uh-sjeqqeluv in the past and a history of altercations with strangers, and poor hygiene  including rotting food in apartment, there is concern for harm to self or to others. Thus, petition for commitment and rodriguez was initiated. The pre-petition screener came to talk to him last week, and a court hold is currently in place; we are waiting for commitment and Rodriguez. On 6/28 a code was called for patient's worsening agitation (yelling at and swearing at peers, slamming doors, slamming plate in room, slamming toilet seat in room); he took oral olanzapine and ativan, and afterward was calm in his room. He required emergency forced medication (oral olanzapine and ativan) on 6/29, 6/30, and 7/1 for continued disorganization and agitation.   Pt continues to require emergency neuroleptics for safety due to his psychotic symptoms.    Discontinued Medications (& Rationale):  Fluoxetine- Anorgasmia    Medical course: Tex Garland was brought in by EMS to [facility] ED on 6/19/2019. He was transferred to [facility] for psychiatric evaluation.    Plan   Principal Diagnosis:   # schizophrenia in the context of medication non-adherence      Secondary psychiatric diagnoses of concern this admission: none     Psychotropic Medications:  Modify:  24 hour emergency oral olanzapine (10mg) and ativan (1mg) OR IM olanzapine tonight due to continued concern for safety (continues to be threatening, agitated)     Continue:  - Acetaminophen 650 mg PRN for pain  - Albuterol inhaler PRN for wheezing, shortness of breath/dyspnea  - Hydroxyzine 25 mg PRN for anxiety  - Lorazepam 0.5 mg PRN for anxiety  - Olanzapine 10 mg PRN for agitation  - Trazodone 50 mg PRN for sleep     Patient will be treated in therapeutic milieu with appropriate individual and group therapies as described.        Medical diagnoses to be addressed this admission:    none     Data: see above  Consults: none    Legal Status: Court Hold    Safety Assessment:   Behavioral Orders   Procedures     Code 1 - Restrict to Unit     Elopement precautions     Routine  Programming     As clinically indicated     Sexual precautions     Single Room     Status 15     Every 15 minutes.       Disposition: unknown pending medication management and clinical stabilization     Teresa Zhang MD   PGY-1    Attestation:  This patient has been seen and evaluated by me, Shawna Lugo.  I have discussed this patient with the house staff team including the resident and medical student and I agree with the findings and plan in this note.    I have reviewed today's vital signs, medications, labs and imaging. Shawna Lugo M.D., Ph.D.

## 2019-07-02 NOTE — PROGRESS NOTES
Remains isolative to his room. Pt refuses group. Presents paranoid. Hypervigilant while out. Presents tense and irritable. Only out to get his meal and water. Room is very disheveled as is pt.      07/01/19 1900   Behavioral Health   Thinking paranoid;delusional;distractable;poor concentration;confused   Orientation situation, disoriented   Insight poor   Judgement impaired   Eye Contact into space;out of corner of eyes   Affect tense;irritable   Mood anxious;irritable   Physical Appearance/Attire disheveled   Hygiene neglected grooming - unclean body, hair, teeth;body odor   1. Wish to be Dead No   2. Non-Specific Active Suicidal Thoughts  No   Activity isolative;withdrawn;refusal   Psychomotor / Gait balanced;steady   Psycho Education   Type of Intervention structured groups   Response refuses   Activities of Daily Living   Hygiene/Grooming prompts  (Refused all)

## 2019-07-02 NOTE — PROGRESS NOTES
"Refused to get up for breakfast. Isolative to his room all shift, out only for lunch and water. Pt had instance of aggression toward peer. Pt aggressively \"shoulder bumped\" peer then began rambling profanity and posturing. Pt redirected by writer, went to his room. Pt denied he was the aggressor. Brian responding to internal stim, yelling and talking to self while in his room. Freq repetitive door slamming. Presents paranoid delusional.       07/02/19 1000   Behavioral Health   Hallucinations appears responding;auditory;other (see comment)  (Yelling out and talking to self in his rm)   Thinking paranoid;delusional;confused;distractable;poor concentration   Orientation situation, disoriented;time, disoriented;date, disoriented   Memory confabulation   Insight poor   Judgement impaired   Eye Contact into space;out of corner of eyes   Affect tense;irritable   Mood anxious;irritable   Physical Appearance/Attire disheveled   Hygiene neglected grooming - unclean body, hair, teeth   Activity refusal;isolative;withdrawn   Speech rambling;other (see comments)  (Freq profanity)   Psychomotor / Gait balanced;steady   Overt Agression (WDL) Ex   Overt Aggression Scale   Verbal Aggression 2   Aggression against Property 0   Auto-Aggression 0   Physical Aggression 7  (Agressively \"shoulder bumped\" peer )   Overt Aggression Total Score 9   Activities of Daily Living   Hygiene/Grooming prompts  (Refuses all)     "

## 2019-07-02 NOTE — PROGRESS NOTES
Pt isolated to his room for the day, and was irritable upon approach. He declined to attend his court appearance. Will continue to assess and offer support.

## 2019-07-03 PROCEDURE — 25000132 ZZH RX MED GY IP 250 OP 250 PS 637: Mod: GY | Performed by: STUDENT IN AN ORGANIZED HEALTH CARE EDUCATION/TRAINING PROGRAM

## 2019-07-03 PROCEDURE — 12400001 ZZH R&B MH UMMC

## 2019-07-03 PROCEDURE — 99232 SBSQ HOSP IP/OBS MODERATE 35: CPT | Mod: GC | Performed by: PSYCHIATRY & NEUROLOGY

## 2019-07-03 RX ORDER — OLANZAPINE 10 MG/2ML
15 INJECTION, POWDER, FOR SOLUTION INTRAMUSCULAR ONCE
Status: DISCONTINUED | OUTPATIENT
Start: 2019-07-03 | End: 2019-07-03

## 2019-07-03 RX ORDER — OLANZAPINE 10 MG/2ML
10 INJECTION, POWDER, FOR SOLUTION INTRAMUSCULAR ONCE
Status: COMPLETED | OUTPATIENT
Start: 2019-07-03 | End: 2019-07-03

## 2019-07-03 RX ADMIN — ACETAMINOPHEN 650 MG: 325 TABLET, FILM COATED ORAL at 16:00

## 2019-07-03 RX ADMIN — OLANZAPINE 15 MG: 10 TABLET, ORALLY DISINTEGRATING ORAL at 21:31

## 2019-07-03 RX ADMIN — LORAZEPAM 1 MG: 1 TABLET ORAL at 21:31

## 2019-07-03 ASSESSMENT — ACTIVITIES OF DAILY LIVING (ADL)
LAUNDRY: WITH SUPERVISION
ORAL_HYGIENE: PROMPTS
ORAL_HYGIENE: PROMPTS
DRESS: INDEPENDENT
HYGIENE/GROOMING: PROMPTS
HYGIENE/GROOMING: PROMPTS
DRESS: INDEPENDENT
LAUNDRY: UNABLE TO COMPLETE

## 2019-07-03 NOTE — PLAN OF CARE
"Tex continues to refuse interactions with staff-sarcastic often nonsensical responses when approached by staff-more angry today, at one point slamming door X3 and then retreating to his room-Mom also states Tex more angry today-Mom wanted MDs to know Tex stabilized quickly on Geodon in past, although was not maintained on Geodon outpt-Tex approached desk at 1500 demanding lunch-when reminded he had eaten chicken salad x2 for lunch he yelled, \"That was breakfast stupid!\"-stomped back to his room and slammed the door before snack could be offered  "

## 2019-07-03 NOTE — PROGRESS NOTES
Pt isolative to room entire shift. Observed responding to AH in rooms, talking to self, conversations themed around hell and the Grim Reaper. Pt occasionally will stick head out of door to look up and down hallway and then slam door. Frequent profanity being used toward staff when attempting to engage. Pt observed spending large amounts of time in bathroom. Pt continues to create unsanitary conditions in room and refuses to perform ADL's. Pt was visited by mother and sister and became very angry and was screaming at them for bringing the wrong kind of pizza. Pt's only concern seems to revolve around food, and does not come out of room for any other need. Pt remains paranoid and seems to think staff on unit are out to get him.      07/02/19 2200   Behavioral Health   Hallucinations appears responding;auditory   Thinking paranoid;delusional   Orientation situation, disoriented   Insight poor   Judgement impaired   Eye Contact into space   Affect angry   Mood anxious;irritable;depressed   Physical Appearance/Attire disheveled;untidy   Hygiene neglected grooming - unclean body, hair, teeth   Suicidality other (see comments)  (MARY)   1. Wish to be Dead   (MARY)   2. Non-Specific Active Suicidal Thoughts    (MARY)   Self Injury other (see comment)  (MARY)   Activity isolative   Speech pressured;tangential   Medication Sensitivity no observed side effects   Psychomotor / Gait balanced;steady   Psycho Education   Type of Intervention 1:1 intervention   Response refuses   Activities of Daily Living   Hygiene/Grooming prompts   Oral Hygiene prompts   Dress independent   Laundry unable to complete   Room Organization prompts

## 2019-07-03 NOTE — PROGRESS NOTES
"    ----------------------------------------------------------------------------------------------------------  [facility], [place]   Psychiatric Progress Note  Hospital Day #14     Interim History:   The patient's care was discussed with the treatment team and chart notes were reviewed.    Sleep: 5.5hrs  Scheduled Medications: taken as prescribed    Staff Report: Isolated to room, declined attending court appearance. Aggressively shoulder-bumped peer, postured and used profanities afterward. Frequently yelling&talking to self in room, slamming door. Conversations with self revolved around hell and the Grim Reaper. Refuses to perform ADLs. Screamed at family members for bringing him the wrong type of pizza.     Patient Interview: Interviewed from doorway of room, he was pacing in the room without making eye contact. When asked if he had breakfast this morning, he said \"yep.\" When asked if we could do anything for him, he replied, \"get me out of the hospital.\" When told that that couldn't happen today, he said, \"ok leave my room, then.\" Let patient know that if he wanted to talk about anything, we were available.     The risks, benefits, alternatives and side effects of any medication changes have been discussed and are understood by the patient and other caregivers.    Review of systems:     ROS was negative unless noted above.          Allergies:     Allergies   Allergen Reactions     Mold      Itchy eyes, congestion     Nkda [No Known Drug Allergies]             Psychiatric Examination:   BP (!) 155/93 (BP Location: Right arm)   Pulse 106   Temp 97.6  F (36.4  C) (Oral)   Resp 16   Ht 1.829 m (6')   SpO2 97%   BMI 42.86 kg/m    Weight is 0 lbs 0 oz  Body mass index is 42.86 kg/m .    Appearance:  poorly groomed, pacing in room   Attitude:  evasive and guarded  Eye Contact:  No eye contact, faced away from writer throughout interview  Mood:  \"great\"   Affect:  mood incongruent, " intensity is blunted and constricted mobility; speaks in loud, mocking, tone at baseline, and then raises voice when frustrated  Speech:  clear, coherent  Psychomotor Behavior:  no evidence of tardive dyskinesia, dystonia, or tics  Thought Process:  disorganized  Associations:  difficult to assess, given patient's short answers  Thought Content:  no evidence of suicidal ideation or homicidal ideation and patient appears to be responding to internal stimuli  Insight:  poor  Judgment:  poor  Language: speaks fluent english         Labs:   No results found for this or any previous visit (from the past 24 hour(s)).       Assessment    Diagnostic Impression: Tex Garland is a 39 year old male with a past psychiatric history of schizoaffective disorder depressed type and seasonal affective disorder presented due to paranoia, disorganization, and agitation in the context of medication nonadherence. The patient has no known family history of mental illness. Current social factors include unemployment, lives alone, not having a car or phone, father with Parkinson's/Lewy Body dementia. Psychological factors include previous diagnoses of schizoaffective disorder depressed type and seasonal affective disorders. Biological factors include medication non-compliance. Likely diagnosis is schizophrenia given his marked disorganization and paranoia, and lack of strong mood symptoms (other than irritability), although difficult to assess mood symptoms given patient's extreme guardedness and agitation.     Hospital course: Tex Garland was admitted to station [facility] NB on a 72 hour hold. Restarting aripiprazole or other antipsychotics was discussed with the patient, but he refused, as he attributed anorgasmia to abilify. Team recommended tapering off fluoxetine due to its adverse side effect, but he refused--since he is declining antipsychotics and therapeutic benefit of fluxoetine is uncertain, it was discontinued  . Due to the need for bhkwjw-sf-wzulyyria in the past and a history of altercations with strangers, and poor hygiene including rotting food in apartment, there is concern for harm to self or to others. Thus, petition for commitment and rodriguez was initiated. The pre-petition screener came to talk to him last week, and a court hold is currently in place; we are waiting for commitment and Rodriguez. On 6/28 a code was called for patient's worsening agitation (yelling at and swearing at peers, slamming doors, slamming plate in room, slamming toilet seat in room); he took oral olanzapine and ativan, and afterward was calm in his room. He required emergency forced medication (oral olanzapine and ativan) on 6/29, 6/30, 7/1, and 7/2 for continued disorganization and agitation. Pt continues to require emergency neuroleptics for safety due to his psychotic symptoms.    Discontinued Medications (& Rationale):  Fluoxetine- Anorgasmia    Medical course: Tex Garland was brought in by EMS to [facility] ED on 6/19/2019. He was transferred to [facility] for psychiatric evaluation.    Plan   Principal Diagnosis:   # schizophrenia in the context of medication non-adherence      Secondary psychiatric diagnoses of concern this admission: none     Psychotropic Medications:  Modify:  24 hour emergency oral olanzapine (15mg) and ativan (1mg) OR IM olanzapine (10mg) tonight due to continued concern for safety (continues to be threatening, agitated)     Continue:  - Acetaminophen 650 mg PRN for pain  - Albuterol inhaler PRN for wheezing, shortness of breath/dyspnea  - Hydroxyzine 25 mg PRN for anxiety  - Lorazepam 0.5 mg PRN for anxiety  - Olanzapine 10 mg PRN for agitation  - Trazodone 50 mg PRN for sleep     Patient will be treated in therapeutic milieu with appropriate individual and group therapies as described.        Medical diagnoses to be addressed this admission:    none     Data: see above  Consults: none    Legal Status: Court  Hold    Safety Assessment:   Behavioral Orders   Procedures     Code 1 - Restrict to Unit     Elopement precautions     Routine Programming     As clinically indicated     Sexual precautions     Single Room     Status 15     Every 15 minutes.       Disposition: unknown pending medication management and clinical stabilization     Teresa Zhang MD   PGY-1    Attestation:  This patient has been seen and evaluated by me, Shawna Lugo.  I have discussed this patient with the house staff team including the resident and medical student and I agree with the findings and plan in this note.    I have reviewed today's vital signs, medications, labs and imaging. Shawna Lugo M.D., Ph.D.

## 2019-07-04 PROCEDURE — 12400001 ZZH R&B MH UMMC

## 2019-07-04 NOTE — PROGRESS NOTES
Pt isolative to room almost entire shift. Occasionally comes out of room to look up and down bose and then slams door shut. Pt exhibiting OCD traits, will usually shut or slam door three times. Pt came out several times demanding to know when lunch was going to be here, despite being told multiple times that lunch was several hours ago. Pt was very irritable and profane towards staff, accusing us of starving him. Pt observed having loud conversations by himself in his room throughout the shift. Pt appears very paranoid. Refuses to check in with staff or answer questions.      07/03/19 2100   Behavioral Health   Hallucinations appears responding   Thinking delusional;paranoid;confused   Orientation situation, disoriented;time, disoriented   Insight poor   Judgement impaired   Eye Contact into space;out of corner of eyes   Affect angry;tense;irritable   Mood anxious;irritable   Physical Appearance/Attire disheveled   Hygiene body odor;neglected grooming - unclean body, hair, teeth   Suicidality   (MARY)   1. Wish to be Dead   (MARY)   2. Non-Specific Active Suicidal Thoughts    (MARY)   Self Injury other (see comment)  (MARY)   Activity isolative   Speech pressured;tangential   Medication Sensitivity no observed side effects   Psychomotor / Gait balanced;steady   Psycho Education   Type of Intervention 1:1 intervention   Response refuses   Group Therapy Session   Group Attendance refused to attend group session   Activities of Daily Living   Hygiene/Grooming prompts   Oral Hygiene prompts   Dress independent   Laundry unable to complete   Room Organization prompts

## 2019-07-04 NOTE — PROGRESS NOTES
07/04/19 1420   Behavioral Health   Hallucinations appears responding   Thinking delusional;poor concentration   Orientation situation, disoriented   Insight poor   Judgement impaired   Eye Contact at floor;into space   Affect tense;irritable   Mood anxious;irritable   Physical Appearance/Attire disheveled   Hygiene body odor   Suicidality other (see comments)  (denies)   1. Wish to be Dead No   2. Non-Specific Active Suicidal Thoughts  No   Self Injury other (see comment)  (none observed or reported )   Elopement   (none observed or reported)   Activity isolative;withdrawn   Speech tangential   Medication Sensitivity no stated side effects     Pt was mostly in his room during the shift. Affect has been tense. Behavior has been calm and controlled. Was observed to be out of his room during meals. When approached, pt is dismissive. Appeared to be responding to internal stimulus. Offers minimal insight to his day. Contracts for safety.

## 2019-07-05 PROCEDURE — 25000132 ZZH RX MED GY IP 250 OP 250 PS 637: Mod: GY | Performed by: STUDENT IN AN ORGANIZED HEALTH CARE EDUCATION/TRAINING PROGRAM

## 2019-07-05 PROCEDURE — 99231 SBSQ HOSP IP/OBS SF/LOW 25: CPT | Mod: GC | Performed by: PSYCHIATRY & NEUROLOGY

## 2019-07-05 PROCEDURE — 12400001 ZZH R&B MH UMMC

## 2019-07-05 RX ORDER — OLANZAPINE 10 MG/2ML
10 INJECTION, POWDER, FOR SOLUTION INTRAMUSCULAR ONCE
Status: COMPLETED | OUTPATIENT
Start: 2019-07-05 | End: 2019-07-05

## 2019-07-05 RX ADMIN — OLANZAPINE 15 MG: 10 TABLET, ORALLY DISINTEGRATING ORAL at 19:16

## 2019-07-05 ASSESSMENT — ACTIVITIES OF DAILY LIVING (ADL)
ORAL_HYGIENE: PROMPTS;INDEPENDENT
DRESS: INDEPENDENT
HYGIENE/GROOMING: PROMPTS;INDEPENDENT

## 2019-07-05 NOTE — PROGRESS NOTES
"    ----------------------------------------------------------------------------------------------------------  [facility], [place]   Psychiatric Progress Note  Hospital Day #16     Interim History:   The patient's care was discussed with the treatment team and chart notes were reviewed.    Sleep: 6.5hrs  Scheduled Medications: taken as prescribed    Staff Report: Exhibiting OCD traits, will usually slam door 3 times. Accused staff of starving him, hours after receiving lunch. Isolated to room most of the day, appears responding to internal stimuli. Approached staff requesting Utox results, after given results, stated \"at least they can't send me to [facility] tx.\"     Patient Interview: Interviewed from doorway of room, said \"I just got my damn lunch, can it wait?\" Team agreed to see him later.     The risks, benefits, alternatives and side effects of any medication changes have been discussed and are understood by the patient and other caregivers.    Review of systems:     ROS was negative unless noted above.          Allergies:     Allergies   Allergen Reactions     Mold      Itchy eyes, congestion     Nkda [No Known Drug Allergies]             Psychiatric Examination:   BP (!) 162/114 (BP Location: Right arm)   Pulse 109   Temp 98.2  F (36.8  C) (Tympanic)   Resp 16   Ht 1.829 m (6')   SpO2 97%   BMI 42.86 kg/m    Weight is 0 lbs 0 oz  Body mass index is 42.86 kg/m .    Appearance:  poorly groomed, laying on stomach in room with lunch tray  Attitude:  evasive and guarded  Eye Contact:  No eye contact, faced away from writer throughout (short) interview  Mood:  \"great\"   Affect:  mood incongruent, intensity is blunted and constricted mobility; speaks in loud, mocking, tone at baseline, and then raises voice when frustrated  Speech:  clear, coherent  Psychomotor Behavior:  no evidence of tardive dyskinesia, dystonia, or tics  Thought Process:  disorganized  Associations:  difficult to " assess, given patient's short answers  Thought Content:  no evidence of suicidal ideation or homicidal ideation and patient appears to be responding to internal stimuli  Insight:  poor  Judgment:  poor  Language: speaks fluent english         Labs:   No results found for this or any previous visit (from the past 24 hour(s)).       Assessment    Diagnostic Impression: Tex Garland is a 39 year old male with a past psychiatric history of schizoaffective disorder depressed type and seasonal affective disorder presented due to paranoia, disorganization, and agitation in the context of medication nonadherence. The patient has no known family history of mental illness. Current social factors include unemployment, lives alone, not having a car or phone, father with Parkinson's/Lewy Body dementia. Psychological factors include previous diagnoses of schizoaffective disorder depressed type and seasonal affective disorders. Biological factors include medication non-compliance. Likely diagnosis is schizophrenia given his marked disorganization and paranoia, and lack of strong mood symptoms (other than irritability), although difficult to assess mood symptoms given patient's extreme guardedness and agitation.     Hospital course: Tex Garland was admitted to station 22 NB on a 72 hour hold. Restarting aripiprazole or other antipsychotics was discussed with the patient, but he refused, as he attributed anorgasmia to abilify. Team recommended tapering off fluoxetine due to its adverse side effect, but he refused--since he is declining antipsychotics and therapeutic benefit of fluxoetine is uncertain, it was discontinued . Due to the need for vxjcoy-vo-icdrsbfgt in the past and a history of altercations with strangers, and poor hygiene including rotting food in apartment, there is concern for harm to self or to others. Thus, petition for commitment and rodriguez was initiated. The pre-petition screener came to  talk to him last week, and a court hold is currently in place; we are waiting for commitment and Nicole. On 6/28 a code was called for patient's worsening agitation (yelling at and swearing at peers, slamming doors, slamming plate in room, slamming toilet seat in room); he took oral olanzapine and ativan, and afterward was calm in his room. He required emergency forced medication (oral olanzapine and ativan) daily starting 6/29 for continued disorganization and agitation. Pt continues to require emergency neuroleptics for safety due to his psychotic symptoms.    Discontinued Medications (& Rationale):  Fluoxetine- Anorgasmia    Medical course: Tex Garland was brought in by EMS to [facility] ED on 6/19/2019. He was transferred to [facility] for psychiatric evaluation.    Plan   Principal Diagnosis:   # schizophrenia (in the context of medication non-adherence) r/o schizoaffective disorder     Secondary psychiatric diagnoses of concern this admission: none     Psychotropic Medications:  Modify:  24 hour emergency oral olanzapine (15mg) and ativan (1mg) OR IM olanzapine (10mg) tonight due to continued concern for safety (continues to be threatening, agitated)     Continue:  - Acetaminophen 650 mg PRN for pain  - Albuterol inhaler PRN for wheezing, shortness of breath/dyspnea  - Hydroxyzine 25 mg PRN for anxiety  - Lorazepam 0.5 mg PRN for anxiety  - Olanzapine 10 mg PRN for agitation  - Trazodone 50 mg PRN for sleep     Patient will be treated in therapeutic milieu with appropriate individual and group therapies as described.        Medical diagnoses to be addressed this admission:    none     Data: see above  Consults: none    Legal Status: Court Hold    Safety Assessment:   Behavioral Orders   Procedures     Code 1 - Restrict to Unit     Elopement precautions     Routine Programming     As clinically indicated     Sexual precautions     Single Room     Status 15     Every 15 minutes.       Disposition: unknown  pending medication management and clinical stabilization     Teresa Zhang MD   PGY-1    Attestation:  This patient has been seen and evaluated by me, Shawna Lugo.  I have discussed this patient with the house staff team including the resident and medical student and I agree with the findings and plan in this note.    I have reviewed today's vital signs, medications, labs and imaging. Shawna Lugo M.D., Ph.D.

## 2019-07-05 NOTE — PROVIDER NOTIFICATION
"Pt spent majority of time in room, coming out for dinner tray which he brought to room.  Pt approached staff once requesting u-tox results; but pt suddenly left, saying \"Do it later.\"  After pt given results, pt quipped \"At least they can't send me to [facility] Tx.\"   Pt again slammed door times one later in shift.  "

## 2019-07-05 NOTE — PLAN OF CARE
"  Pt presents with delusional thought pattern, paranoia and confusion. Eye contact seen to be at examiner and at the floor. Affect angry, blunted, tense and irritable. Mood anxious, irritable, labile and depressed. Appearance disheveled and untidy. Pt seen to be neglecting personal hygiene. Activity isolative, withdrawn and hyperactive. Speech pressured.     Pt seen to spend all of this shift in his room. Pt has been heard yelling loudly/swearing and throwing objects in his room. Pt has also been slamming his door. Pt slammed his door approximately 5 times during the AM hrs. When writer enters room to assess why Pt is having outbursts, Pt seen to be calm with slight irritability. Writer checked in with Pt at approximately 0945 and 1219 due to Pt's outbursts. At time of check in, Pt denies any issues being present and denies need for PRN medication. Pt has been asked to not slam doors and throw objects. Pt partially adherent to these requests. At time of 1219 check-in, Pt asked if lunch was on the unit. Writer and staff member explained lunch was on unit. Pt looked down the bose and said \"Nah. Doesn't look like it. Thanks\" Lunch cart was visible in the bose at this time.     Pt states his appetite is \"good\" but goes on to explain that he has not been eating and he does not receive enough food. Pt states he received two salads for breakfast yesterday and then had to wait 11 hrs for the correct food items to arrive. In reference to sleep hygiene, Pt states \"I'm not getting too much sleep. It's better than last week.\"     Pt denies any adverse/side effects to medications but states that \"Zyprexa is not working.They gave me 10, 10, 15, 15 and then 15.\" Pt denies any acute physical ailments.     When asked to rate his anxiety on a numerical 1-10 scale, Pt states \"My anxiety is fine.\" When asked to rate his depression on the same scale, Pt states \"It's hard to say cause I'm in the hospital. It fluctuates. I'm more " "depressed than when I came in.\"    Pt denies SI/SIB/HI. Pt denies AH/VH. Pt appears to be responding to internal stimuli. At time of check in, Pt states \"I've passed the BP test 20 times. I don't need to be here.\"  "

## 2019-07-06 PROCEDURE — 25000132 ZZH RX MED GY IP 250 OP 250 PS 637: Mod: GY | Performed by: STUDENT IN AN ORGANIZED HEALTH CARE EDUCATION/TRAINING PROGRAM

## 2019-07-06 PROCEDURE — 12400001 ZZH R&B MH UMMC

## 2019-07-06 RX ORDER — OLANZAPINE 10 MG/2ML
10 INJECTION, POWDER, FOR SOLUTION INTRAMUSCULAR ONCE
Status: COMPLETED | OUTPATIENT
Start: 2019-07-06 | End: 2019-07-06

## 2019-07-06 RX ADMIN — OLANZAPINE 15 MG: 10 TABLET, ORALLY DISINTEGRATING ORAL at 20:19

## 2019-07-06 ASSESSMENT — ACTIVITIES OF DAILY LIVING (ADL)
DRESS: STREET CLOTHES
ORAL_HYGIENE: PROMPTS
LAUNDRY: UNABLE TO COMPLETE
HYGIENE/GROOMING: INDEPENDENT

## 2019-07-06 NOTE — PLAN OF CARE
"  Patient pacing in room, talking/yelling to self.  Pt again took dinner to room after thanking staff.  Pt mocking nurse once when offered Emergency Zyprexa med., but took it with slight hesitation:\"Do I have to take both [pills] now?\"   Pt's mothered called earlier in shift concerned that pt was making slow progress.  "

## 2019-07-06 NOTE — PROGRESS NOTES
Patient very pleasant today. Appetite good. Requested patient to step out of room while it was cleaned and patient was compliant. Still isolative and withdrawn.          07/06/19 6147   Behavioral Health   Hallucinations appears responding   Thinking distractable;delusional;paranoid;poor concentration   Orientation situation, disoriented;person: oriented;place: oriented   Memory confabulation   Insight poor;denial of illness   Judgement impaired   Affect tense   Mood anxious   Physical Appearance/Attire disheveled;untidy   Hygiene neglected grooming - unclean body, hair, teeth   1. Wish to be Dead No   Activity withdrawn;restless;isolative   Speech pressured;flight of ideas

## 2019-07-07 PROCEDURE — 25000132 ZZH RX MED GY IP 250 OP 250 PS 637: Mod: GY | Performed by: STUDENT IN AN ORGANIZED HEALTH CARE EDUCATION/TRAINING PROGRAM

## 2019-07-07 PROCEDURE — 12400001 ZZH R&B MH UMMC

## 2019-07-07 RX ORDER — OLANZAPINE 10 MG/2ML
10 INJECTION, POWDER, FOR SOLUTION INTRAMUSCULAR AT BEDTIME
Status: DISCONTINUED | OUTPATIENT
Start: 2019-07-07 | End: 2019-07-08

## 2019-07-07 RX ADMIN — OLANZAPINE 15 MG: 5 TABLET, ORALLY DISINTEGRATING ORAL at 16:48

## 2019-07-07 ASSESSMENT — ACTIVITIES OF DAILY LIVING (ADL)
DRESS: STREET CLOTHES
ORAL_HYGIENE: INDEPENDENT
LAUNDRY: UNABLE TO COMPLETE
HYGIENE/GROOMING: INDEPENDENT

## 2019-07-07 NOTE — PROGRESS NOTES
07/06/19 2100   Behavioral Health   Hallucinations appears responding   Thinking delusional;paranoid;poor concentration   Orientation date, disoriented;person, disoriented;situation, disoriented   Memory confabulation   Insight poor   Judgement impaired   Affect irritable;tense   Mood anxious   Physical Appearance/Attire untidy   Hygiene neglected grooming - unclean body, hair, teeth;body odor   1. Wish to be Dead No   Activities of Daily Living   Hygiene/Grooming independent   Oral Hygiene prompts   Dress street clothes   Laundry unable to complete   Room Organization prompts   Patient was observed talking to himself. He stayed in his room for most part of the shift, he only came out for food and water. He was not interactive with staff and other patient's.

## 2019-07-07 NOTE — PROGRESS NOTES
"   07/07/19 1200   Behavioral Health   Hallucinations denies / not responding to hallucinations   Thinking distractable;paranoid   Orientation situation, disoriented;time, disoriented   Memory other (see comment)  (unable to assess)   Insight poor   Judgement impaired   Eye Contact at floor   Affect blunted, flat;tense   Mood irritable;anxious   Physical Appearance/Attire attire appropriate to age and situation   Hygiene well groomed   1. Wish to be Dead No   2. Non-Specific Active Suicidal Thoughts  No   Activity isolative;withdrawn;restless   Speech pressured;rambling   Psycho Education   Type of Intervention 1:1 intervention   Response participates with encouragement   Hours 0.5   Treatment Detail   (check-in)   Safety   Elopement status 15;signs posted on unit entrance / exit doors   Sexual status 15;private room   Activities of Daily Living   Hygiene/Grooming independent   Oral Hygiene independent   Dress street clothes   Laundry unable to complete   Room Organization prompts     Pt was withdrawn and isolative to his room, coming out at times to get snacks and meals before returning. Pt was pleasant with this writer, however he at one point said \"Get the fuck out\" after writer brought him his menu. While speaking with pt, writer observed pt is preoccupied with meals and snacks. He stated that \"It's time for dinner. I'm not going to get dinner later\" when lunch was due to arrive in 30 minutes--writer reassured pt that both lunch and dinner would arrive today. Pt denies SI/SIb and hallucinations, no pain. Writer encouraged pt to try coming out into the milieu later if he felt up to it, pt stated he feels more comfortable alone in his room. While he was mostly polite during the check-in, he was visibly tense, terse, and at times irritable with writer for speaking with him.     "

## 2019-07-08 PROCEDURE — 99232 SBSQ HOSP IP/OBS MODERATE 35: CPT | Mod: GC | Performed by: PSYCHIATRY & NEUROLOGY

## 2019-07-08 PROCEDURE — 25000132 ZZH RX MED GY IP 250 OP 250 PS 637: Mod: GY | Performed by: STUDENT IN AN ORGANIZED HEALTH CARE EDUCATION/TRAINING PROGRAM

## 2019-07-08 PROCEDURE — 12400001 ZZH R&B MH UMMC

## 2019-07-08 RX ORDER — OLANZAPINE 10 MG/2ML
10 INJECTION, POWDER, FOR SOLUTION INTRAMUSCULAR ONCE
Status: COMPLETED | OUTPATIENT
Start: 2019-07-08 | End: 2019-07-08

## 2019-07-08 RX ADMIN — OLANZAPINE 15 MG: 10 TABLET, ORALLY DISINTEGRATING ORAL at 21:35

## 2019-07-08 RX ADMIN — LORAZEPAM 1 MG: 1 TABLET ORAL at 21:35

## 2019-07-08 ASSESSMENT — ACTIVITIES OF DAILY LIVING (ADL)
LAUNDRY: WITH SUPERVISION
LAUNDRY: WITH SUPERVISION
HYGIENE/GROOMING: PROMPTS
HYGIENE/GROOMING: PROMPTS
ORAL_HYGIENE: PROMPTS
DRESS: INDEPENDENT
ORAL_HYGIENE: PROMPTS
DRESS: INDEPENDENT

## 2019-07-08 NOTE — PLAN OF CARE
"Tex showing small amt improvement-able to have several sentence conversation with female staff with only small amt anxiety observed-does continue to keep [employer] pizza box on display in his room-anxious about staff removing trays frrom room, but able to compromise and allow-does continue freq talking to self as though in conversation with another when he is alone in his room, \"Often yelling loudly, \"He's a fucking ass hole.\"-speech very pressured while visiting with mom-declined 1:1, but did not yell at staff to leave his room-BP this /108 approx 1100 159/99-will continue to approach with offer of support  "

## 2019-07-08 NOTE — PROGRESS NOTES
"    ----------------------------------------------------------------------------------------------------------  [facility], [place]   Psychiatric Progress Note  Hospital Day #19     Interim History:   The patient's care was discussed with the treatment team and chart notes were reviewed.    Sleep: 6.75hrs  Scheduled Medications: taken as prescribed    Staff Report: Spent most of day in room, heard yelling loudly/swearing and throwing objects in room. Slamming door. Stated that \"Zyprexa is not working.They gave me 10, 10, 15, 15 and then 15.\" When asked to rate his anxiety on a numerical 1-10 scale, Pt states \"My anxiety is fine.\" When asked to rate his depression on the same scale, Pt states \"It's hard to say cause I'm in the hospital. It fluctuates. I'm more depressed than when I came in.\"     Patient Interview: Interviewed from doorway of room, said that he was doing \"just great.\" When asking him how the zyprexa is working, he said that he didn't need it, and just wanted the prozac. Offered to change zyprexa to a different antipsychotic, and he firmly stated not to change to abilify, due to sexual side effects. When offered haldol, he declined, despite assurance that sexual side effects would be unlikely, and stated that he would do prozac and zyprexa.      Spoke with mother on the phone, she noticed that on the 4th of July, he was doing well, but on the 5th of July, noticed that he was much worse (after not have emergency medication on the evening of the 4th). States that he has been stable for 8 years prior to this hospitalization, is usually a kind person. Noted that he has always had some social anxiety. She said that the OCD-like traits (\"I'm in ritual\") are new for him.     The risks, benefits, alternatives and side effects of any medication changes have been discussed and are understood by the patient and other caregivers.    Review of systems:     ROS was negative unless noted " "above.          Allergies:     Allergies   Allergen Reactions     Mold      Itchy eyes, congestion     Nkda [No Known Drug Allergies]             Psychiatric Examination:   BP (!) 159/99 (BP Location: Right arm)   Pulse 85   Temp 98.2  F (36.8  C) (Tympanic)   Resp 16   Ht 1.829 m (6')   SpO2 97%   BMI 42.86 kg/m    Weight is 0 lbs 0 oz  Body mass index is 42.86 kg/m .    Appearance:  poorly groomed, laying on stomach in room with journal  Attitude:  evasive and guarded  Eye Contact:  No eye contact, faced away from writer throughout interview  Mood:  \"just great\"   Affect:  mood incongruent, intensity is blunted and constricted mobility; speaks in loud, mocking, tone at baseline, and then raises voice when frustrated  Speech:  clear, coherent  Psychomotor Behavior:  no evidence of tardive dyskinesia, dystonia, or tics  Thought Process:  disorganized  Associations:  difficult to assess, given patient's short answers  Thought Content:  no evidence of suicidal ideation or homicidal ideation and patient appears to be responding to internal stimuli&have auditory hallucinations  Insight:  poor  Judgment:  poor  Language: speaks fluent english         Labs:   No results found for this or any previous visit (from the past 24 hour(s)).       Assessment    Diagnostic Impression: Tex Garland is a 39 year old male with a past psychiatric history of schizoaffective disorder depressed type and seasonal affective disorder presented due to paranoia, disorganization, and agitation in the context of medication nonadherence. The patient has no known family history of mental illness. Current social factors include unemployment, lives alone, not having a car or phone, father with Parkinson's/Lewy Body dementia. Psychological factors include previous diagnoses of schizoaffective disorder depressed type and seasonal affective disorders. Biological factors include medication non-compliance. Likely diagnosis is " schizophrenia given his marked disorganization and paranoia, and lack of strong mood symptoms (other than irritability), although difficult to assess mood symptoms given patient's extreme guardedness and agitation.     Hospital course: Tex Garland was admitted to station 22 NB on a 72 hour hold. Restarting aripiprazole or other antipsychotics was discussed with the patient, but he refused, as he attributed anorgasmia to abilify. Team recommended tapering off fluoxetine due to its adverse side effect, but he refused--since he is declining antipsychotics and therapeutic benefit of fluxoetine is uncertain, it was discontinued . Due to the need for [id number] in the past and a history of altercations with strangers, and poor hygiene including rotting food in apartment, there is concern for harm to self or to others. Thus, petition for commitment and rodriguez was initiated.  On 6/28 a code was called for patient's worsening agitation (yelling at and swearing at peers, slamming doors, slamming plate in room, slamming toilet seat in room); he took oral olanzapine and ativan, and afterward was calm in his room. He required emergency forced medication (oral olanzapine and ativan) daily starting 6/29 for continued disorganization and agitation. Pt continues to require emergency neuroleptics for safety due to his psychotic symptoms. Prozac was re-initiated due to patient request, history of medicine being efficacious.     Discontinued Medications (& Rationale):  Fluoxetine- Anorgasmia    Medical course: Tex Garland was brought in by EMS to [facility] ED on 6/19/2019. He was transferred to [facility] for psychiatric evaluation.    Plan   Principal Diagnosis:   # schizophrenia (in the context of medication non-adherence) r/o schizoaffective disorder     Secondary psychiatric diagnoses of concern this admission: none     Psychotropic Medications:  Modify:  -start prozac 20mg daily  -24 hour emergency oral  olanzapine (15mg) and ativan (1mg) OR IM olanzapine (10mg) tonight due to continued concern for safety (continues to be threatening, agitated)     Continue:  - Acetaminophen 650 mg PRN for pain  - Albuterol inhaler PRN for wheezing, shortness of breath/dyspnea  - Hydroxyzine 25 mg PRN for anxiety  - Lorazepam 0.5 mg PRN for anxiety  - Olanzapine 10 mg PRN for agitation  - Trazodone 50 mg PRN for sleep     Patient will be treated in therapeutic milieu with appropriate individual and group therapies as described.        Medical diagnoses to be addressed this admission:    none     Data: see above  Consults: none    Legal Status: Court Hold    Safety Assessment:   Behavioral Orders   Procedures     Code 1 - Restrict to Unit     Elopement precautions     Routine Programming     As clinically indicated     Sexual precautions     Single Room     Status 15     Every 15 minutes.       Disposition: unknown pending medication management and clinical stabilization     Teresa Zhang MD   PGY-1    Attestation:  This patient has been seen and evaluated by me, Shawna Lugo.  I have discussed this patient with the house staff team including the resident and medical student and I agree with the findings and plan in this note.    I have reviewed today's vital signs, medications, labs and imaging. Shawna Lugo M.D., Ph.D.

## 2019-07-08 NOTE — PROGRESS NOTES
BEHAVIORAL TEAM DISCUSSION     Participants: Dr. Shawna Londono PGY-1  Carolina Roth, PhD (covering for [facility])  Progress: New admit; continuing to assess  Continued Stay Criteria/Rationale: Patient is experiencing active psychosis, is exhibiting poor self-care (eating rotten food) and aggressive behavior towards others. Requires further intervention, assessment.   Medical/Physical: Patient has a history of medication non-compliant   Precautions:        Behavioral Orders   Procedures     Code 1 - Restrict to Unit     Elopement precautions     Routine Programming       As clinically indicated     Single Room     Status 15       Every 15 minutes.      Plan: Pt's medication adjustment continues. Pt will continue to be engaged in the therapeutic milieu. Care coordination will continue to ensure that he is well-established for continuation of care, following his discharge, when pt stabilizes. Staff will continue to encourage patient to engage in self-care (taking showers, brushing his teeth, changing his clothing,etc.)  Rationale for change in precautions or plan: No change.

## 2019-07-09 PROCEDURE — 25000132 ZZH RX MED GY IP 250 OP 250 PS 637: Mod: GY | Performed by: STUDENT IN AN ORGANIZED HEALTH CARE EDUCATION/TRAINING PROGRAM

## 2019-07-09 PROCEDURE — 12400001 ZZH R&B MH UMMC

## 2019-07-09 PROCEDURE — 99232 SBSQ HOSP IP/OBS MODERATE 35: CPT | Mod: GC | Performed by: PSYCHIATRY & NEUROLOGY

## 2019-07-09 RX ORDER — OLANZAPINE 10 MG/2ML
10 INJECTION, POWDER, FOR SOLUTION INTRAMUSCULAR AT BEDTIME
Status: DISCONTINUED | OUTPATIENT
Start: 2019-07-09 | End: 2019-07-10

## 2019-07-09 RX ORDER — OLANZAPINE 10 MG/2ML
10 INJECTION, POWDER, FOR SOLUTION INTRAMUSCULAR ONCE
Status: DISCONTINUED | OUTPATIENT
Start: 2019-07-09 | End: 2019-07-09

## 2019-07-09 RX ORDER — OLANZAPINE 10 MG/1
20 TABLET, ORALLY DISINTEGRATING ORAL AT BEDTIME
Status: DISCONTINUED | OUTPATIENT
Start: 2019-07-09 | End: 2019-07-10

## 2019-07-09 RX ADMIN — OLANZAPINE 20 MG: 10 TABLET, ORALLY DISINTEGRATING ORAL at 21:41

## 2019-07-09 RX ADMIN — LORAZEPAM 1 MG: 1 TABLET ORAL at 19:29

## 2019-07-09 RX ADMIN — FLUOXETINE 20 MG: 20 CAPSULE ORAL at 08:32

## 2019-07-09 ASSESSMENT — ACTIVITIES OF DAILY LIVING (ADL)
ORAL_HYGIENE: PROMPTS
HYGIENE/GROOMING: PROMPTS
LAUNDRY: UNABLE TO COMPLETE
DRESS: INDEPENDENT

## 2019-07-09 NOTE — PROGRESS NOTES
Re: Commitment with Nicole    Writer received commitment paperwork from [facility]. Patient is committed to the the [employer] and [facility] until 1/8/2020.  Medication on Nicole Order includes the following: Risperdal, Haldol, Anatensol and Clozaril.     Dipti Cobb, MIRTHAC, LADC

## 2019-07-09 NOTE — PROGRESS NOTES
"Pt was observed withdrawn to their room for the majority of the evening, coming out only to get their dinner tray and snacks. Throughout the evening pt was overheard yelling to themselves in their room. Pt was observed to open and shut their door loudly (in sequences of three) numerous times throughout the evening as well. Pt came out of their room as writer was doing rounds and asked \"is someone bringing down my meds\". When writer told them their nurse would be coming down shortly to give them their medication, pt aggressively responded \"they better it's getting fucking late\" and slammed their door shut.      07/08/19 2149   Behavioral Health   Hallucinations appears responding   Thinking distractable;poor concentration;confused;delusional   Orientation person: oriented;place: oriented   Memory   (MARY)   Insight poor   Judgement impaired   Eye Contact at floor   Affect irritable;angry;tense   Mood irritable   Physical Appearance/Attire untidy   Hygiene neglected grooming - unclean body, hair, teeth   1. Wish to be Dead No   2. Non-Specific Active Suicidal Thoughts  No   Activity withdrawn;isolative   Speech clear;pressured   Medication Sensitivity no observed side effects   Psychomotor / Gait balanced;steady;agitated   Psycho Education   Type of Intervention 1:1 intervention   Response observes from a distance   Hours 0.5   Activities of Daily Living   Hygiene/Grooming prompts   Oral Hygiene prompts   Dress independent   Laundry with supervision   Room Organization independent     "

## 2019-07-09 NOTE — PROGRESS NOTES
"    ----------------------------------------------------------------------------------------------------------  [facility], [place]   Psychiatric Progress Note  Hospital Day #20     Interim History:   The patient's care was discussed with the treatment team and chart notes were reviewed.    Sleep: 6hrs  Scheduled Medications: taken as prescribed    Staff Report: Able to have several sentence conversation with female staff, improvement. Continues to have anxiety surrounding removing of food tray from room. Continues to frequently talk to self in room, often yelling \"he's a fucking asshole.\" Slams door in sequences of 3. Asked if someone was bringing meds in the evening, and when told they would shortly, responded, \"they better it's getting fucking late.\"    Patient Interview: Interviewed from doorway of room, agrees that taking prozac went well, was frustrated that he is still taking zyprexa. Reminded us that he won't take abilify due to sexual side effects. Does not report any side effects with zyprexa.    The risks, benefits, alternatives and side effects of any medication changes have been discussed and are understood by the patient and other caregivers.    Review of systems:     ROS was negative unless noted above.          Allergies:     Allergies   Allergen Reactions     Mold      Itchy eyes, congestion     Nkda [No Known Drug Allergies]             Psychiatric Examination:   BP (!) 144/97 (BP Location: Right arm)   Pulse 101   Temp 97.9  F (36.6  C) (Oral)   Resp 17   Ht 1.829 m (6')   SpO2 97%   BMI 42.86 kg/m    Weight is 0 lbs 0 oz  Body mass index is 42.86 kg/m .    Appearance:  poorly groomed, sitting on edge of bed facing the doorway  Attitude:  evasive and guarded  Eye Contact:  Minimal eye contact  Mood:  \"just great\"   Affect:  mood incongruent, intensity is blunted and constricted mobility; speaks in loud, mocking, tone at baseline, and then raises voice when " frustrated  Speech:  clear, coherent  Psychomotor Behavior:  no evidence of tardive dyskinesia, dystonia, or tics  Thought Process:  disorganized  Associations:  difficult to assess, given patient's short answers  Thought Content:  no evidence of suicidal ideation or homicidal ideation and patient appears to be responding to internal stimuli , likely have auditory hallucinations  Insight:  poor  Judgment:  poor  Language: speaks fluent english         Labs:   No results found for this or any previous visit (from the past 24 hour(s)).       Assessment    Diagnostic Impression: Tex Garland is a 39 year old male with a past psychiatric history of schizoaffective disorder depressed type and seasonal affective disorder presented due to paranoia, disorganization, and agitation in the context of medication nonadherence. The patient has no known family history of mental illness. Current social factors include unemployment, lives alone, not having a car or phone, father with Parkinson's/Lewy Body dementia. Psychological factors include previous diagnoses of schizoaffective disorder depressed type and seasonal affective disorders. Biological factors include medication non-compliance. Likely diagnosis is schizophrenia given his marked disorganization and paranoia, and lack of strong mood symptoms (other than irritability), although difficult to assess mood symptoms given patient's extreme guardedness and agitation.     Hospital course: Tex Garland was admitted to station 22 NB on a 72 hour hold. Restarting aripiprazole or other antipsychotics was discussed with the patient, but he refused, as he attributed anorgasmia to abilify. Team recommended tapering off fluoxetine due to its adverse side effect, but he refused--since he is declining antipsychotics and therapeutic benefit of fluxoetine is uncertain, it was discontinued . Due to the need for [id number] in the past and a history of  altercations with strangers, and poor hygiene including rotting food in apartment, there is concern for harm to self or to others. Thus, petition for commitment and rodriguez was initiated.  On 6/28 a code was called for patient's worsening agitation (yelling at and swearing at peers, slamming doors, slamming plate in room, slamming toilet seat in room); he took oral olanzapine and ativan, and afterward was calm in his room. He required emergency forced medication (oral olanzapine and ativan) daily starting 6/29 for continued disorganization and agitation. Pt continues to require emergency neuroleptics for safety due to his psychotic symptoms. Prozac was re-initiated due to patient request, history of medicine being efficacious.     Discontinued Medications (& Rationale):  Fluoxetine- Anorgasmia    Medical course: Tex Garland was brought in by EMS to [facility] ED on 6/19/2019. He was transferred to [facility] for psychiatric evaluation.    Plan   Principal Diagnosis:   # schizophrenia (in the context of medication non-adherence) r/o schizoaffective disorder     Secondary psychiatric diagnoses of concern this admission: none     Psychotropic Medications:  Modify:  -oral olanzapine (20mg) OR IM olanzapine (10mg)      Continue:  - cont prozac 20mg daily  - Acetaminophen 650 mg PRN for pain  - Albuterol inhaler PRN for wheezing, shortness of breath/dyspnea  - Hydroxyzine 25 mg PRN for anxiety  - Lorazepam 0.5 mg PRN for anxiety  - Olanzapine 10 mg PRN for agitation  - Trazodone 50 mg PRN for sleep     Patient will be treated in therapeutic milieu with appropriate individual and group therapies as described.        Medical diagnoses to be addressed this admission:    none     Data: see above  Consults: none    Legal Status: [place]  Committed    Safety Assessment:   Behavioral Orders   Procedures     Code 1 - Restrict to Unit     Elopement precautions     Routine Programming     As clinically indicated     Sexual  precautions     Single Room     Status 15     Every 15 minutes.       Disposition: unknown pending medication management and clinical stabilization     Teresa Zhang MD   PGY-1    Attestation:  This patient has been seen and evaluated by me, Shawna Lugo.  I have discussed this patient with the house staff team including the resident and medical student and I agree with the findings and plan in this note.    I have reviewed today's vital signs, medications, labs and imaging. Shawna Lugo M.D., Ph.D.

## 2019-07-09 NOTE — PROGRESS NOTES
"   07/09/19 1255   Behavioral Health   Hallucinations appears responding   Thinking distractable;poor concentration   Orientation place: oriented;date, disoriented   Insight poor   Judgement impaired   Eye Contact at floor   Affect tense;angry   Mood irritable   Physical Appearance/Attire disheveled  (was able to change into clean scrubs)   Hygiene neglected grooming - unclean body, hair, teeth   Suicidality other (see comments)  (denies)   1. Wish to be Dead No   2. Non-Specific Active Suicidal Thoughts  No   Self Injury other (see comment)  (none observed or reported )   Elopement   (none observed or reported )   Activity withdrawn;isolative   Speech tangential   Medication Sensitivity no stated side effects   Psychomotor / Gait steady     Pt continues to isolate in his room for most of the shift. Affect has been tense. When approached, pt is guarded, but is able to speak with writer. Eye contact is at the floor during conversations. Pt appears to talk to himself when alone in his room. Observed to close his door multiple times before leaving it. Pt was open to getting new set of linen for his bed, and agreed to have his pants washed. He reports his day is, \"okay,\" but made clear he wants to stay in his room while he eats. No concerns reported. Denies thoughts to hurt himself or others. Contracts for safety.   "

## 2019-07-10 PROCEDURE — 12400001 ZZH R&B MH UMMC

## 2019-07-10 PROCEDURE — 25000132 ZZH RX MED GY IP 250 OP 250 PS 637: Mod: GY | Performed by: STUDENT IN AN ORGANIZED HEALTH CARE EDUCATION/TRAINING PROGRAM

## 2019-07-10 PROCEDURE — 99232 SBSQ HOSP IP/OBS MODERATE 35: CPT | Mod: GC | Performed by: PSYCHIATRY & NEUROLOGY

## 2019-07-10 RX ORDER — DIPHENHYDRAMINE HYDROCHLORIDE 50 MG/ML
50 INJECTION INTRAMUSCULAR; INTRAVENOUS EVERY 6 HOURS PRN
Status: DISCONTINUED | OUTPATIENT
Start: 2019-07-10 | End: 2019-07-30 | Stop reason: HOSPADM

## 2019-07-10 RX ORDER — LORAZEPAM 2 MG/ML
2 INJECTION INTRAMUSCULAR EVERY 6 HOURS PRN
Status: DISCONTINUED | OUTPATIENT
Start: 2019-07-10 | End: 2019-07-30 | Stop reason: HOSPADM

## 2019-07-10 RX ORDER — HALOPERIDOL 5 MG/ML
5 INJECTION INTRAMUSCULAR EVERY 6 HOURS PRN
Status: DISCONTINUED | OUTPATIENT
Start: 2019-07-10 | End: 2019-07-30 | Stop reason: HOSPADM

## 2019-07-10 RX ORDER — HALOPERIDOL 5 MG/1
5 TABLET ORAL DAILY
Status: DISCONTINUED | OUTPATIENT
Start: 2019-07-10 | End: 2019-07-15

## 2019-07-10 RX ORDER — HALOPERIDOL 5 MG/ML
5 INJECTION INTRAMUSCULAR DAILY
Status: DISCONTINUED | OUTPATIENT
Start: 2019-07-10 | End: 2019-07-15

## 2019-07-10 RX ADMIN — FLUOXETINE 20 MG: 20 CAPSULE ORAL at 09:10

## 2019-07-10 RX ADMIN — HALOPERIDOL 5 MG: 5 TABLET ORAL at 21:31

## 2019-07-10 ASSESSMENT — ACTIVITIES OF DAILY LIVING (ADL)
LAUNDRY: WITH SUPERVISION
DRESS: INDEPENDENT
LAUNDRY: UNABLE TO COMPLETE
ORAL_HYGIENE: PROMPTS
HYGIENE/GROOMING: PROMPTS
DRESS: INDEPENDENT
HYGIENE/GROOMING: PROMPTS
ORAL_HYGIENE: INDEPENDENT

## 2019-07-10 NOTE — PROGRESS NOTES
"    ----------------------------------------------------------------------------------------------------------  [facility], [place]   Psychiatric Progress Note  Hospital Day #21     Interim History:   The patient's care was discussed with the treatment team and chart notes were reviewed.    Sleep: 6.5hrs  Scheduled Medications: taken as prescribed    Staff Report: Written commitment and rodriguez paperwork received. Patient continues to isolate to room, affect has been tense. Allowed his pants to be washed, open to getting a new set of linen for bed. Spent most of shift talking or yelling to self, and writing. Declined showering because shower is too small. Used cups of water to bathe in bathroom sink.     Patient Interview: Interviewed from doorway of room, spoke for much longer time than other days. Reports that he had a sensation of feet shaking while sleeping last night that was uncomfortable, attributes this to prior evening dose of zyprexa. Likes that he is taking zyprexa. When offered to switch zyprexa to haldol, said \"no, that will mess with my brain and cause sex organ problems.\" When assured that haldol is unlikely to cause sexual dysfunction, he disagreed. (Of note, he has not tried haldol in the past per records.) Said that he doesn't need any antipsychotic, since his blood pressure readings have been fine \"25 or more times.\" When reminded that he has actually had elevated blood pressure readings much of this hospitalization, he blamed the way the cuff was placed on his arm, saying that he frequently reminds staff how to place the cuff correctly.     The risks, benefits, alternatives and side effects of any medication changes have been discussed and are understood by the patient and other caregivers.    Review of systems:     ROS was negative unless noted above.          Allergies:     Allergies   Allergen Reactions     Mold      Itchy eyes, congestion     Nkda [No Known Drug " "Allergies]             Psychiatric Examination:   BP (!) 150/92 (BP Location: Right arm)   Pulse 96   Temp 97.8  F (36.6  C) (Oral)   Resp 17   Ht 1.829 m (6')   SpO2 97%   BMI 42.86 kg/m    Weight is 0 lbs 0 oz  Body mass index is 42.86 kg/m .    Appearance:  poorly groomed, sitting on edge of bed facing the doorway  Attitude:  evasive and guarded  Eye Contact:  Minimal eye contact, although more than yesterday  Mood:  \"just great\"   Affect:  mood incongruent, intensity is blunted and constricted mobility; speaks in loud, mocking, tone at baseline, and then raises voice when frustrated  Speech:  clear, coherent  Psychomotor Behavior:  no evidence of tardive dyskinesia, dystonia, or tics  Thought Process:  disorganized  Associations:  difficult to assess, given patient's short answers  Thought Content:  no evidence of suicidal ideation or homicidal ideation and patient appears to be responding to internal stimuli , likely has auditory hallucinations  Insight:  poor  Judgment:  poor  Language: speaks fluent english         Labs:   No results found for this or any previous visit (from the past 24 hour(s)).       Assessment    Diagnostic Impression: Tex Garland is a 39 year old male with a past psychiatric history of schizoaffective disorder depressed type and seasonal affective disorder presented due to paranoia, disorganization, and agitation in the context of medication nonadherence. The patient has no known family history of mental illness. Current social factors include unemployment, lives alone, not having a car or phone, father with Parkinson's/Lewy Body dementia. Psychological factors include previous diagnoses of schizoaffective disorder depressed type and seasonal affective disorders. Biological factors include medication non-compliance. Likely diagnosis is schizophrenia given his marked disorganization and paranoia, and lack of strong mood symptoms (other than irritability), although " "difficult to assess mood symptoms given patient's extreme guardedness and agitation.     Hospital course: Tex Garland was admitted to station 22 NB on a 72 hour hold. Restarting aripiprazole or other antipsychotics was discussed with the patient, but he refused, as he attributed anorgasmia to abilify. Team recommended tapering off fluoxetine due to its adverse side effect, but he refused--since he was declining antipsychotics and therapeutic benefit of fluxoetine is uncertain, it was discontinued . Due to the need for [id number] in the past and a history of altercations with strangers, and poor hygiene including rotting food in apartment, there is concern for harm to self or to others. Thus, petition for commitment and nicole was initiated.  On 6/28 a code was called for patient's worsening agitation (yelling at and swearing at peers, slamming doors, slamming plate in room, slamming toilet seat in room); he took oral olanzapine and ativan, and afterward was calm in his room. He required emergency forced medication (oral olanzapine and ativan) daily starting 6/29 for safety due to his psychotic symptoms. Prozac was re-initiated due to patient request, history of medicine being efficacious for mood. Nicole and commitment order received 7/9. Due to likely akathisia and limited response to zyprexa, zyprexa was discontinued for psychosis, and haldol initiated.     Discontinued Medications (& Rationale):  Fluoxetine- Anorgasmia    Medical course: Tex Garland was brought in by EMS to [facility] ED on 6/19/2019. He was transferred to [facility] for psychiatric evaluation.    Plan   Principal Diagnosis:   # schizophrenia  r/o schizoaffective disorder     Secondary psychiatric diagnoses of concern this admission: none     Psychotropic Medications:  Modify:  -add haldol 5mg PO or IM (nicole medication)  - discontinue zyprexa 20mg PO (or 10mg IM)  - discontinue zyprexa PRN, add \"B52\" (benadryl, haldol, " ativan) PRN for agitation     Continue:  - cont prozac 20mg daily  - Acetaminophen 650 mg PRN for pain  - Albuterol inhaler PRN for wheezing, shortness of breath/dyspnea  - Hydroxyzine 25 mg PRN for anxiety  - Lorazepam 0.5 mg PRN for anxiety  - Olanzapine 10 mg PRN for agitation  - Trazodone 50 mg PRN for sleep     Patient will be treated in therapeutic milieu with appropriate individual and group therapies as described.        Medical diagnoses to be addressed this admission:    none     Data: see above  Consults: none    Legal Status: Nicole  Committed    Safety Assessment:   Behavioral Orders   Procedures     Code 1 - Restrict to Unit     Elopement precautions     Routine Programming     As clinically indicated     Sexual precautions     Single Room     Status 15     Every 15 minutes.       Disposition: unknown pending medication management and clinical stabilization     Teresa Zhang MD   PGY-1    Attestation:  This patient has been seen and evaluated by me, Shawna Lugo.  I have discussed this patient with the house staff team including the resident and medical student and I agree with the findings and plan in this note.    I have reviewed today's vital signs, medications, labs and imaging. Shawna Lugo M.D., Ph.D.

## 2019-07-10 NOTE — PLAN OF CARE
BEHAVIORAL TEAM DISCUSSION    Participants:   Dr. Lugo, Attending  Dipti Cobb, LPCC, LADC, CTC  Teresa Zhang MD, PGY-1  Mikie Orozco, MS3  Carolina Sheffield RN  Progress: Presentation evident for paranoia and disorganization.   Continued Stay Criteria/Rationale: Patient continues to display MH symptoms. PD was revoked  Medical/Physical: The Review of Systems is negative other than noted in the HPI   Precautions:   Behavioral Orders   Procedures    Code 1 - Restrict to Unit    Elopement precautions    Routine Programming     As clinically indicated    Sexual precautions    Single Room    Status 15     Every 15 minutes.   Plan: Upon discharge the patient will be referred to services as appropriate based on the assessment. Medication changes have been made based on psychiatric assessment.  Rationale for change in precautions or plan: No change

## 2019-07-10 NOTE — PLAN OF CARE
Tex continues to state he is not mentally ill-does, however, continue to state he needs Prozac-spent entire day in his room-out to return trays and couple brief walks up bose-appears to rush back to his room-brief responses to questions and dismisses staff from his room-conversation and behavior more organized

## 2019-07-10 NOTE — PROGRESS NOTES
Pt spent majority of shift in their room resting, talking/yelling to self, and writing.  Pt's room is very messy, staff went in and cleaned up some of the clutter for pt.  Pt continues to not allow house keeping to enter his room to clean.  Pt was prompted to shower, pt declined prompt stating the showers are too small.  Pt did not come out for dinner, dinner was brought to pt in their room, pt ate their dinner and snacks.  Pt later in evening came out of their room, filled up many cups of water, and used the water to take a bath in their bathroom sink.  Pt is rude to staff on approach.  Pt becomes pleasant if staff are doing something that the pt endorses.  Pt stays irritable or becomes more rude if staff are their to give meds, question pt, or take vitals.         07/09/19 1618   Behavioral Health   Hallucinations appears responding   Thinking distractable   Insight poor   Judgement impaired   Eye Contact at examiner;at floor   Affect tense;irritable   Mood irritable   Physical Appearance/Attire disheveled   Hygiene other (see comment)  (Pt gave self bath in their bathroom sink.)   1. Wish to be Dead No   2. Non-Specific Active Suicidal Thoughts  No   Activity withdrawn;isolative   Speech tangential   Psychomotor / Gait steady   Activities of Daily Living   Hygiene/Grooming prompts   Oral Hygiene prompts   Dress independent   Laundry unable to complete   Room Organization prompts

## 2019-07-11 PROCEDURE — 12400001 ZZH R&B MH UMMC

## 2019-07-11 PROCEDURE — 25000132 ZZH RX MED GY IP 250 OP 250 PS 637: Mod: GY | Performed by: STUDENT IN AN ORGANIZED HEALTH CARE EDUCATION/TRAINING PROGRAM

## 2019-07-11 PROCEDURE — 99232 SBSQ HOSP IP/OBS MODERATE 35: CPT | Mod: GC | Performed by: PSYCHIATRY & NEUROLOGY

## 2019-07-11 RX ADMIN — HALOPERIDOL 5 MG: 5 TABLET ORAL at 22:24

## 2019-07-11 ASSESSMENT — ACTIVITIES OF DAILY LIVING (ADL)
DRESS: INDEPENDENT;STREET CLOTHES
HYGIENE/GROOMING: PROMPTS
ORAL_HYGIENE: INDEPENDENT
HYGIENE/GROOMING: INDEPENDENT;PROMPTS
DRESS: STREET CLOTHES;INDEPENDENT
ORAL_HYGIENE: INDEPENDENT

## 2019-07-11 NOTE — PROGRESS NOTES
Isolative to room all of shift. Came out to get food and water but quickly retreats to room. Pt washed self in room today. Pt was much more affable today than usual. Was not irritable with staff but continues to be very dismissive and avoid interaction or conversation. Pt had visit with sister and mother which went well. Pt is beginning to have large collection of [employer] boxes in room, set up in a shrine type manner which seem to hold significance to him, and he does not allow them to be removed.      07/10/19 2100   Behavioral Health   Hallucinations appears responding   Thinking delusional;paranoid   Orientation person: oriented;place: oriented;date: oriented;time: oriented   Memory baseline memory   Insight poor   Judgement impaired   Eye Contact out of corner of eyes   Affect tense   Mood anxious   Physical Appearance/Attire disheveled   Hygiene neglected grooming - unclean body, hair, teeth   Suicidality other (see comments)  (denies )   1. Wish to be Dead No   2. Non-Specific Active Suicidal Thoughts  No   Self Injury other (see comment)  (none observed)   Elopement   (no indicators )   Activity isolative;withdrawn   Speech clear;coherent   Medication Sensitivity no stated side effects;no observed side effects   Psychomotor / Gait balanced;steady   Activities of Daily Living   Hygiene/Grooming prompts   Oral Hygiene independent   Dress independent   Laundry unable to complete   Room Organization prompts

## 2019-07-11 NOTE — PROGRESS NOTES
Pt isolative to room, came out to get meals.  Observed talking on the phone.  Appears tense, anxious.  No SI/SIB or medication side effects observed.       07/11/19 1400   Behavioral Health   Hallucinations appears responding   Thinking paranoid   Orientation person: oriented;place: oriented   Insight poor   Judgement impaired   Eye Contact out of corner of eyes   Affect tense   Mood anxious   Physical Appearance/Attire disheveled   Hygiene neglected grooming - unclean body, hair, teeth   Suicidality other (see comments)  (none observed)   1. Wish to be Dead No   2. Non-Specific Active Suicidal Thoughts  No   Self Injury other (see comment)  (none obeserved)   Activity isolative;withdrawn   Speech clear;coherent   Medication Sensitivity no observed side effects   Psychomotor / Gait balanced;steady   Psycho Education   Type of Intervention 1:1 intervention   Response observes from a distance   Activities of Daily Living   Hygiene/Grooming prompts   Oral Hygiene independent   Dress independent;street clothes   Room Organization independent

## 2019-07-11 NOTE — PROGRESS NOTES
Patient slept 5 hours overnight. Patient unable to remain asleep. Patient opened and slammed his room door x 2. No problem identified.

## 2019-07-11 NOTE — PROGRESS NOTES
"    ----------------------------------------------------------------------------------------------------------  [facility], [place]   Psychiatric Progress Note  Hospital Day #22     Interim History:   The patient's care was discussed with the treatment team and chart notes were reviewed.    Sleep: 5hrs  Scheduled Medications: taken as prescribed (took oral haldol)    Staff Report: States he is not mentally ill, but does continue to state that he needs prozac. Conversation and behavior more organized, left room to return food trays. Had visit with mom and sister than went well. Beginning to have a large collection of [employer] boxes in room, set up in a shrine type manner, and he does not let them be removed. Opened and slammed door overnight.      Patient Interview: Interviewed from doorway of room, reports not liking haldol, as he does not thinks he needs it. Does not report any side effects, however. Has concerns about haldol causing \"sex organ\" side effects, but does not report currently having them. Encouraged to let team know if he does have any side effects from medication. When asked if his visit with his mother went well, with a raised/mocking tone said, \"she visits everyday...yeah, of course!\"    The risks, benefits, alternatives and side effects of any medication changes have been discussed and are understood by the patient and other caregivers.    Review of systems:     ROS was negative unless noted above.          Allergies:     Allergies   Allergen Reactions     Mold      Itchy eyes, congestion     Nkda [No Known Drug Allergies]             Psychiatric Examination:   BP (!) 150/92 (BP Location: Right arm)   Pulse 96   Temp 97.8  F (36.6  C) (Oral)   Resp 17   Ht 1.829 m (6')   SpO2 97%   BMI 42.86 kg/m    Weight is 0 lbs 0 oz  Body mass index is 42.86 kg/m .    Appearance:  poorly groomed, sitting on edge of bed facing the doorway  Attitude:  evasive and guarded  Eye " "Contact:  Minimal eye contact, although more than yesterday  Mood:  \"just great\"   Affect:  mood incongruent, intensity is blunted and constricted mobility; speaks in loud, mocking, tone at baseline, and then raises voice when frustrated  Speech:  clear, coherent  Psychomotor Behavior:  no evidence of tardive dyskinesia, dystonia, or tics  Thought Process:  disorganized  Associations:  difficult to assess, given patient's short answers  Thought Content:  no evidence of suicidal ideation or homicidal ideation and patient appears to be responding to internal stimuli , likely has auditory hallucinations  Insight:  poor  Judgment:  poor  Language: speaks fluent english         Labs:   No results found for this or any previous visit (from the past 24 hour(s)).       Assessment    Diagnostic Impression: Tex Garland is a 39 year old male with a past psychiatric history of schizoaffective disorder depressed type and seasonal affective disorder presented due to paranoia, disorganization, and agitation in the context of medication nonadherence. The patient has no known family history of mental illness. Current social factors include unemployment, lives alone, not having a car or phone, father with Parkinson's/Lewy Body dementia. Psychological factors include previous diagnoses of schizoaffective disorder depressed type and seasonal affective disorders. Biological factors include medication non-compliance. Likely diagnosis is schizophrenia given his marked disorganization and paranoia, and lack of strong mood symptoms (other than irritability), although difficult to assess mood symptoms given patient's extreme guardedness and agitation.     Hospital course: Tex Garland was admitted to station [facility] on a 72 hour hold. Restarting aripiprazole or other antipsychotics was discussed with the patient, but he refused, as he attributed anorgasmia to abilify. Team recommended tapering off fluoxetine due to its " adverse side effect, but he refused--since he was declining antipsychotics and therapeutic benefit of fluxoetine is uncertain, it was discontinued . Due to the need for [id number] in the past and a history of altercations with strangers, and poor hygiene including rotting food in apartment, there is concern for harm to self or to others. Thus, petition for commitment and rodriguez was initiated.  On 6/28 a code was called for patient's worsening agitation (yelling at and swearing at peers, slamming doors, slamming plate in room, slamming toilet seat in room); he took oral olanzapine and ativan, and afterward was calm in his room. He required emergency forced medication (oral olanzapine and ativan) daily starting 6/29 for safety due to his psychotic symptoms. Prozac was re-initiated due to patient request, history of medicine being efficacious for mood. Rodriguez and commitment order received 7/9. Due to likely akathisia and limited response to zyprexa, zyprexa was discontinued for psychosis, and haldol initiated.     Discontinued Medications (& Rationale):  Fluoxetine- Anorgasmia    Medical course: Tex Garland was brought in by EMS to [facility] ED on 6/19/2019. He was transferred to [facility] for psychiatric evaluation.    Plan   Principal Diagnosis:   # schizophrenia  vs schizoaffective disorder     Secondary psychiatric diagnoses of concern this admission: none     Psychotropic Medications:  Modify:  none     Continue:  - haldol 5mg PO or IM (rodriguez medication)  - cont prozac 20mg daily  - Acetaminophen 650 mg PRN for pain  - Albuterol inhaler PRN for wheezing, shortness of breath/dyspnea  - Hydroxyzine 25 mg PRN for anxiety  - Lorazepam 0.5 mg PRN for anxiety  - Olanzapine 10 mg PRN for agitation  - Trazodone 50 mg PRN for sleep     Patient will be treated in therapeutic milieu with appropriate individual and group therapies as described.        Medical diagnoses to be addressed this admission:     none     Data: see above  Consults: none    Legal Status: Nicole  Committed    Safety Assessment:   Behavioral Orders   Procedures     Code 1 - Restrict to Unit     Elopement precautions     Routine Programming     As clinically indicated     Sexual precautions     Single Room     Status 15     Every 15 minutes.       Disposition: unknown pending medication management and clinical stabilization     Teresa Zhang MD   PGY-1      Attestation:  This patient has been seen and evaluated by me, Shawna Lugo.  I have discussed this patient with the house staff team including the resident and medical student and I agree with the findings and plan in this note.    I have reviewed today's vital signs, medications, labs and imaging. Shawna Lugo M.D., Ph.D.

## 2019-07-12 PROCEDURE — 25000132 ZZH RX MED GY IP 250 OP 250 PS 637: Mod: GY | Performed by: STUDENT IN AN ORGANIZED HEALTH CARE EDUCATION/TRAINING PROGRAM

## 2019-07-12 PROCEDURE — 12400001 ZZH R&B MH UMMC

## 2019-07-12 PROCEDURE — 99232 SBSQ HOSP IP/OBS MODERATE 35: CPT | Mod: GC | Performed by: PSYCHIATRY & NEUROLOGY

## 2019-07-12 RX ADMIN — HALOPERIDOL 5 MG: 5 TABLET ORAL at 21:12

## 2019-07-12 ASSESSMENT — ACTIVITIES OF DAILY LIVING (ADL)
LAUNDRY: UNABLE TO COMPLETE
DRESS: STREET CLOTHES
HYGIENE/GROOMING: HANDWASHING;SHOWER;INDEPENDENT
ORAL_HYGIENE: INDEPENDENT

## 2019-07-12 NOTE — PROGRESS NOTES
"    ----------------------------------------------------------------------------------------------------------  [facility], [place]   Psychiatric Progress Note  Hospital Day #23     Interim History:   The patient's care was discussed with the treatment team and chart notes were reviewed.    Sleep: 6.75hrs  Scheduled Medications: took oral haldol, did not take prozac yesterday morning     Staff Report: Came out of room to get meals, observed talking on the phone. Paced around room, talking loudly to himself. Slammed door multiple times, allowed staff to clean the room. Was often argumentative with staff, but occasionally nice and agreeable.      Patient Interview: Interviewed from doorway of room. Said that he didn't have any concerns. When asked why he didn't take his prozac yesterday, he said he did. When asked if he will take it today, he said he wouldn't; \"I don't need to take medication, and since you guys are giving me haldol, I'm not going to take prozac.\" He did not report any side effects from haldol.    The risks, benefits, alternatives and side effects of any medication changes have been discussed and are understood by the patient and other caregivers.    Review of systems:     ROS was negative unless noted above.          Allergies:     Allergies   Allergen Reactions     Mold      Itchy eyes, congestion     Nkda [No Known Drug Allergies]             Psychiatric Examination:   BP (!) 141/95 (BP Location: Right arm)   Pulse 112   Temp 98.6  F (37  C) (Tympanic)   Resp 17   Ht 1.829 m (6')   SpO2 97%   BMI 42.86 kg/m    Weight is 0 lbs 0 oz  Body mass index is 42.86 kg/m .    Appearance:  poorly groomed, sitting on edge of bed facing the doorway  Attitude:  evasive and guarded  Eye Contact:  Minimal eye contact, although more than yesterday  Mood:  \"just great\"   Affect:  mood incongruent, intensity is blunted and constricted mobility; speaks in loud, mocking, tone at " baseline, and then raises voice when frustrated  Speech:  clear, coherent  Psychomotor Behavior:  no evidence of tardive dyskinesia, dystonia, or tics  Thought Process:  disorganized  Associations:  difficult to assess, given patient's short answers  Thought Content:  no evidence of suicidal ideation or homicidal ideation and patient appears to be responding to internal stimuli , likely has auditory hallucinations given self-talk in room  Insight:  poor  Judgment:  poor  Language: speaks fluent english         Labs:   No results found for this or any previous visit (from the past 24 hour(s)).       Assessment    Diagnostic Impression: Tex Garland is a 39 year old male with a past psychiatric history of schizoaffective disorder depressed type and seasonal affective disorder presented due to paranoia, disorganization, and agitation in the context of medication nonadherence. The patient has no known family history of mental illness. Current social factors include unemployment, lives alone, not having a car or phone, father with Parkinson's/Lewy Body dementia. Psychological factors include previous diagnoses of schizoaffective disorder depressed type and seasonal affective disorders. Biological factors include medication non-compliance. Likely diagnosis is schizophrenia given his marked disorganization and paranoia, and lack of strong mood symptoms (other than irritability), although difficult to assess mood symptoms given patient's extreme guardedness and agitation.     Hospital course: Tex Garland was admitted to station 22 NB on a 72 hour hold. Restarting aripiprazole or other antipsychotics was discussed with the patient, but he refused, as he attributed anorgasmia to abilify. Team recommended tapering off fluoxetine due to its adverse side effect, but he refused--since he was declining antipsychotics and therapeutic benefit of fluxoetine is uncertain, it was discontinued . Due to the need  for [id number] in the past and a history of altercations with strangers, and poor hygiene including rotting food in apartment, there is concern for harm to self or to others. Thus, petition for commitment and rodriguez was initiated.  On 6/28 a code was called for patient's worsening agitation (yelling at and swearing at peers, slamming doors, slamming plate in room, slamming toilet seat in room); he took oral olanzapine and ativan, and afterward was calm in his room. He required emergency forced medication (oral olanzapine and ativan) daily starting 6/29 for safety due to his psychotic symptoms. Prozac was re-initiated due to patient request, history of medicine being efficacious for mood. Rodriguez and commitment order received 7/9. Due to likely akathisia and limited response to zyprexa, zyprexa was discontinued for psychosis, and haldol initiated.     Discontinued Medications (& Rationale):  Fluoxetine- Anorgasmia    Medical course: Tex Garland was brought in by EMS to [facility] ED on 6/19/2019. He was transferred to [facility] for psychiatric evaluation.    Plan   Principal Diagnosis:   # schizophrenia  vs schizoaffective disorder     Secondary psychiatric diagnoses of concern this admission: none     Psychotropic Medications:  Modify:  none     Continue:  - haldol 5mg PO or IM (rodriguez medication)  - cont prozac 20mg daily  - Acetaminophen 650 mg PRN for pain  - Albuterol inhaler PRN for wheezing, shortness of breath/dyspnea  - Hydroxyzine 25 mg PRN for anxiety  - Lorazepam 0.5 mg PRN for anxiety  - Olanzapine 10 mg PRN for agitation  - Trazodone 50 mg PRN for sleep     Patient will be treated in therapeutic milieu with appropriate individual and group therapies as described.        Medical diagnoses to be addressed this admission:    none     Data: see above  Consults: none    Legal Status: Rodriguez  Committed    Safety Assessment:   Behavioral Orders   Procedures     Code 1 - Restrict to Unit      Elopement precautions     Routine Programming     As clinically indicated     Sexual precautions     Single Room     Status 15     Every 15 minutes.       Disposition: unknown pending medication management and clinical stabilization     Teresa Zhang MD   PGY-1    Attestation:  This patient has been seen and evaluated by me, Shawna Lugo.  I have discussed this patient with the house staff team including the resident and medical student and I agree with the findings and plan in this note.    I have reviewed today's vital signs, medications, labs and imaging. Shawna Lugo M.D., Ph.D.

## 2019-07-12 NOTE — PROGRESS NOTES
Pt was isolative and socially withdrawn throughout nearly the entire shift. Pt was observed laying in bed, reading, writing, talking on the phone, pacing around room, talking loudly to himself in room, and eating dinner. Pt was seen slamming door multiple times during the shift and did not respond well to redirection, often arguing with staff. However, when approached pt would sometimes not be as irritable and oppositional and would be nice and agreeable. During the shift, staff entered the pt's room to clean. Staff found the room to be very messy and unkempt, but the pt was somewhat open to staff cleaning the room. Pt did not report experiencing SI, HI, or SIB  And no clear indication of these symptoms was observed.       07/11/19 1721   Behavioral Health   Hallucinations   (pt observed talking to himself)   Thinking distractable;poor concentration   Orientation person: oriented   Memory   (unsubstantiated)   Insight poor   Judgement impaired   Eye Contact at examiner   Affect tense;irritable   Mood irritable   Physical Appearance/Attire untidy   Hygiene neglected grooming - unclean body, hair, teeth   Suicidality   (pt did not report thoughts or urges)   Self Injury   (pt did not report thoughts or urges)   Elopement   (no apparent risk)   Activity isolative;withdrawn   Speech pressured;tangential   Medication Sensitivity no stated side effects;no observed side effects   Psychomotor / Gait balanced;steady   Activities of Daily Living   Hygiene/Grooming independent;prompts   Oral Hygiene independent   Dress street clothes;independent   Laundry   (pt did not do laundry)   Room Organization prompts   Activity   Activity Assistance Provided independent

## 2019-07-12 NOTE — PROGRESS NOTES
Patient remains isolative to room. Eats all meals in room. Quite anxious upon approach. Very deliberate with gait. Appetite good. Denies symptoms.       07/12/19 1333   Behavioral Health   Hallucinations appears responding   Thinking distractable;delusional;paranoid;poor concentration   Orientation situation, disoriented;person: oriented;place: oriented   Memory confabulation   Insight poor;denial of illness   Judgement impaired   Affect irritable;tense;angry   Mood irritable;anxious;depressed   Physical Appearance/Attire untidy;disheveled   Hygiene neglected grooming - unclean body, hair, teeth   1. Wish to be Dead No   Activity withdrawn;isolative;restless   Speech pressured

## 2019-07-13 PROCEDURE — 25000132 ZZH RX MED GY IP 250 OP 250 PS 637: Mod: GY | Performed by: STUDENT IN AN ORGANIZED HEALTH CARE EDUCATION/TRAINING PROGRAM

## 2019-07-13 PROCEDURE — 12400001 ZZH R&B MH UMMC

## 2019-07-13 RX ADMIN — HALOPERIDOL 5 MG: 5 TABLET ORAL at 20:18

## 2019-07-13 ASSESSMENT — ACTIVITIES OF DAILY LIVING (ADL)
LAUNDRY: WITH SUPERVISION
DRESS: INDEPENDENT
ORAL_HYGIENE: INDEPENDENT
HYGIENE/GROOMING: INDEPENDENT
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
## 2019-07-13 NOTE — PLAN OF CARE
"Tex was very polite when he approached the desk to request paper notes, saying please and thank you. When this nurse asked him if he would allow vital signs, he responded angrily, \"Are you kidding? I'm eating!\" He was offered time to talk, but declined. Has been isolative to room.   "

## 2019-07-14 PROCEDURE — 12400001 ZZH R&B MH UMMC

## 2019-07-14 PROCEDURE — 25000132 ZZH RX MED GY IP 250 OP 250 PS 637: Mod: GY | Performed by: STUDENT IN AN ORGANIZED HEALTH CARE EDUCATION/TRAINING PROGRAM

## 2019-07-14 RX ADMIN — HALOPERIDOL 5 MG: 5 TABLET ORAL at 21:32

## 2019-07-14 ASSESSMENT — ACTIVITIES OF DAILY LIVING (ADL)
HYGIENE/GROOMING: PROMPTS
ORAL_HYGIENE: INDEPENDENT
LAUNDRY: WITH SUPERVISION
DRESS: INDEPENDENT

## 2019-07-14 NOTE — PROGRESS NOTES
Pt spent majority of shift in room resting, pacing, and talking to self.  Pt came out for snacks a couple of times.  Writer approached pt to take vitals, pt was very polite and sociable when writer was in the room.  Pt asked multiple questions about writers well being and remembered conversations from previous days.  Later in the day pt was very irritable and mocking when writer approached.  Pt ate dinner and snacks.  Pt denies SI and SIB.       07/13/19 2310   Behavioral Health   Hallucinations appears responding   Thinking distractable;paranoid   Insight poor   Judgement impaired   Eye Contact at examiner   Affect tense;irritable   Mood labile   Physical Appearance/Attire untidy;disheveled   Hygiene neglected grooming - unclean body, hair, teeth   Suicidality other (see comments)   1. Wish to be Dead No   2. Non-Specific Active Suicidal Thoughts  No   Activity isolative;withdrawn   Speech pressured   Psychomotor / Gait balanced;steady   Psycho Education   Type of Intervention 1:1 intervention   Response participates with encouragement   Hours 0.5   Treatment Detail Check-in   Activities of Daily Living   Hygiene/Grooming independent   Oral Hygiene independent   Dress independent   Laundry with supervision   Room Organization independent

## 2019-07-14 NOTE — PROGRESS NOTES
Subjective: Resident On-Call notified due to concerning BP reading (171/104) and consistently high BP readings over past week.    Systolic BPs ranging between 136-163  Diastolic Bps ranging between     Went to evaluate Mr. Garland who was seen pacing and talking to self in room.  He denied any symptoms of HA, dizziness, visual disturbances, SOA, or chest pain. Advised him to sit calmly for 5 minutes for another BP reading. He was agreeable. He was hyper-verbal but calm and cooperative throughout.    Objective: Repeat /89 in R arm at 5:58 PM    Assessment: Elevated blood pressures likely related to physical hyperactivity in his psychotic state. Not Essential HTN.    Plan: Pt does not warrant starting any hypertensive medication at this time, per JNC8.    On repeat BP measurements, if BP readings are >140/90, ensure pt. has had adequate time to sit calmly for at least 5 mins and measure with Large Adult cuff, per pt's larger habitus.    Manoj Hoskins MD  Psychiatry PGY-2

## 2019-07-14 NOTE — PROGRESS NOTES
Isolative to room, only out for meals. No groups. Continues to talk to self in his room, responding to internal stim. Continues to present irritable and paranoid. No shower. Ate meals.       07/14/19 1300   Behavioral Health   Hallucinations appears responding;auditory   Thinking paranoid;delusional;distractable;confused;poor concentration   Orientation situation, disoriented   Memory confabulation   Insight poor   Judgement impaired   Eye Contact out of corner of eyes   Affect tense;irritable   Mood irritable   Physical Appearance/Attire disheveled   Hygiene neglected grooming - unclean body, hair, teeth;body odor   Speech pressured;rambling   Psychomotor / Gait balanced;steady   Psycho Education   Type of Intervention structured groups   Response refuses   Activities of Daily Living   Hygiene/Grooming   (Refuses all)

## 2019-07-15 PROCEDURE — 12400001 ZZH R&B MH UMMC

## 2019-07-15 PROCEDURE — 25000132 ZZH RX MED GY IP 250 OP 250 PS 637: Mod: GY | Performed by: STUDENT IN AN ORGANIZED HEALTH CARE EDUCATION/TRAINING PROGRAM

## 2019-07-15 RX ORDER — HALOPERIDOL 5 MG/1
10 TABLET ORAL DAILY
Status: DISCONTINUED | OUTPATIENT
Start: 2019-07-15 | End: 2019-07-22

## 2019-07-15 RX ORDER — HALOPERIDOL 5 MG/ML
10 INJECTION INTRAMUSCULAR DAILY
Status: DISCONTINUED | OUTPATIENT
Start: 2019-07-15 | End: 2019-07-22

## 2019-07-15 RX ADMIN — HALOPERIDOL 10 MG: 5 TABLET ORAL at 22:20

## 2019-07-15 ASSESSMENT — ACTIVITIES OF DAILY LIVING (ADL)
HYGIENE/GROOMING: PROMPTS
HYGIENE/GROOMING: PROMPTS
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
## 2019-07-15 NOTE — PROGRESS NOTES
Patient slept 5.25 hours overnight. Patient unable to remain asleep. Patient could be heard loudly cursing in his room in response to the voices. Will continue to monitor and assess.

## 2019-07-15 NOTE — PLAN OF CARE
Tex remains isolative to his room. He has been running frequent high BPs, so MD came up to talk to him about the BP. Tex apparently said that he would NOT be interested in taking more medication. He had been pacing about his room. He was asked to rest for a few minutes. The MD rechecked his BP after 5-10 minutes of rest. It had come down to a reasonable number.    Tex did not attend community meeting. He came out of room just to get his dinner tray and to talk on the phone. Otherwise remains in room. Has not showered. Affect is flat.

## 2019-07-15 NOTE — PROGRESS NOTES
Continues to isolate in his room. Presents anxious and paranoid. Freq talking to self in his room. Gets meals and eats in his room. Refuses all groups. No shower.       07/15/19 1300   Behavioral Health   Hallucinations appears responding;auditory   Thinking paranoid;delusional;poor concentration;distractable   Orientation situation, disoriented   Memory confabulation   Insight poor   Judgement impaired   Eye Contact out of corner of eyes   Affect tense   Mood anxious   Physical Appearance/Attire disheveled   Hygiene neglected grooming - unclean body, hair, teeth   1. Wish to be Dead No   2. Non-Specific Active Suicidal Thoughts  No   Activity isolative;withdrawn;refusal   Speech pressured;rambling   Psychomotor / Gait balanced;steady   Psycho Education   Type of Intervention structured groups   Response refuses   Activities of Daily Living   Hygiene/Grooming prompts  (Declined all)

## 2019-07-16 PROCEDURE — 12400001 ZZH R&B MH UMMC

## 2019-07-16 PROCEDURE — 99232 SBSQ HOSP IP/OBS MODERATE 35: CPT | Mod: GC | Performed by: PSYCHIATRY & NEUROLOGY

## 2019-07-16 PROCEDURE — 25000132 ZZH RX MED GY IP 250 OP 250 PS 637: Mod: GY | Performed by: STUDENT IN AN ORGANIZED HEALTH CARE EDUCATION/TRAINING PROGRAM

## 2019-07-16 RX ADMIN — HALOPERIDOL 10 MG: 5 TABLET ORAL at 21:17

## 2019-07-16 ASSESSMENT — ACTIVITIES OF DAILY LIVING (ADL)
DRESS: INDEPENDENT
HYGIENE/GROOMING: INDEPENDENT
ORAL_HYGIENE: INDEPENDENT
LAUNDRY: WITH SUPERVISION

## 2019-07-16 NOTE — PROGRESS NOTES
"    ----------------------------------------------------------------------------------------------------------  [facility], [place]   Psychiatric Progress Note  Hospital Day #27     Interim History:   The patient's care was discussed with the treatment team and chart notes were reviewed.    Sleep: 6  Scheduled Medications: took oral haldol, continued to not take prozac    Staff Report: Continues to isolate to room, presents anxious and paranoid. Frequently talking to self in room. No shower. Shortly after taking evening 10mg haldol, was seen in room hacking up a lot of mucous, suggestion to stay outside room for 15min or else given a liquid med.     Patient Interview: Interviewed from doorway of room. Said that he was upset with the increased dose of haldol. Continues to have concerns about sexual dysfunction with haldol, and continues to deny any current dysfunction. Says that he is sleeping OK, no concerns. Patient closed the door to end the conversation.     The risks, benefits, alternatives and side effects of any medication changes have been discussed and are understood by the patient and other caregivers.    Review of systems:     ROS was negative unless noted above.          Allergies:     Allergies   Allergen Reactions     Mold      Itchy eyes, congestion     Nkda [No Known Drug Allergies]             Psychiatric Examination:   BP (!) 142/92 (BP Location: Right arm)   Pulse 87   Temp 98.2  F (36.8  C) (Oral)   Resp 16   Ht 1.829 m (6')   SpO2 95%   BMI 42.86 kg/m    Weight is 0 lbs 0 oz  Body mass index is 42.86 kg/m .    Appearance:  poorly groomed, sitting on edge of bed facing the doorway  Attitude:  evasive and guarded  Eye Contact:  Minimal eye contact  Mood:  \"fine\"  Affect:  mood incongruent, intensity is blunted and constricted mobility; speaks in loud, mocking, tone at baseline, and then raises voice when frustrated; labile  Speech:  clear, coherent, " loud  Psychomotor Behavior:  no evidence of tardive dyskinesia, dystonia, or tics  Thought Process:  disorganized  Associations:  difficult to assess, given patient's short answers  Thought Content:  no evidence of suicidal ideation or homicidal ideation and patient appears to be responding to internal stimuli , likely has auditory hallucinations given self-talk in room  Insight:  poor  Judgment:  poor  Language: speaks fluent english         Labs:   No results found for this or any previous visit (from the past 24 hour(s)).       Assessment    Diagnostic Impression: Tex Garland is a 39 year old male with a past psychiatric history of schizoaffective disorder depressed type and seasonal affective disorder presented due to paranoia, disorganization, and agitation in the context of medication nonadherence. The patient has no known family history of mental illness. Current social factors include unemployment, lives alone, not having a car or phone, father with Parkinson's/Lewy Body dementia. Psychological factors include previous diagnoses of schizoaffective disorder depressed type and seasonal affective disorders. Biological factors include medication non-compliance. Likely diagnosis is schizophrenia given his marked disorganization and paranoia, and lack of strong mood symptoms (other than irritability), although difficult to assess mood symptoms given patient's extreme guardedness and agitation.     Hospital course: Tex Garland was admitted to station 22 NB on a 72 hour hold. Restarting aripiprazole or other antipsychotics was discussed with the patient, but he refused, as he attributed anorgasmia to abilify. Team recommended tapering off fluoxetine due to its adverse side effect, but he refused--since he was declining antipsychotics and therapeutic benefit of fluxoetine is uncertain, it was discontinued . Due to the need for [id number]-[id number] in the past and a history of altercations  with strangers, and poor hygiene including rotting food in apartment, there is concern for harm to self or to others. Thus, petition for commitment and rodriguez was initiated.  On 6/28 a code was called for patient's worsening agitation (yelling at and swearing at peers, slamming doors, slamming plate in room, slamming toilet seat in room); he took oral olanzapine and ativan, and afterward was calm in his room. He required emergency forced medication (oral olanzapine and ativan) daily starting 6/29 for safety due to his psychotic symptoms. Prozac was re-initiated due to patient request, history of medicine being efficacious for mood. Rodriguez and commitment order received 7/9. Due to likely akathisia and limited response to zyprexa, zyprexa was discontinued for psychosis, and haldol initiated. Haldol increased from 5mg to 10mg on 7/15 due to continued psychosis.    Discontinued Medications (& Rationale):  Fluoxetine- Anorgasmia    Medical course: Tex Garland was brought in by EMS to [facility] ED on 6/19/2019. He was transferred to [facility] for psychiatric evaluation.    Plan   Principal Diagnosis:   # schizophrenia  vs schizoaffective disorder     Secondary psychiatric diagnoses of concern this admission: none     Psychotropic Medications:  Modify:  none     Continue:  - cont haldol to 10mg PO or IM (rodrigeuz medication)  - cont prozac 20mg daily (due to patient requesting continuing yesterday)  - Acetaminophen 650 mg PRN for pain  - Albuterol inhaler PRN for wheezing, shortness of breath/dyspnea  - Hydroxyzine 25 mg PRN for anxiety  - Lorazepam 0.5 mg PRN for anxiety  - Olanzapine 10 mg PRN for agitation  - Trazodone 50 mg PRN for sleep     Patient will be treated in therapeutic milieu with appropriate individual and group therapies as described.        Medical diagnoses to be addressed this admission:    none     Data: see above  Consults: none    Legal Status: Rodriguez  Committed    Safety Assessment:   Behavioral  Orders   Procedures     Code 1 - Restrict to Unit     Elopement precautions     Routine Programming     As clinically indicated     Sexual precautions     Single Room     Status 15     Every 15 minutes.       Disposition: unknown pending medication management and clinical stabilization     Teresa Zhang MD   PGY-1      Attestation:  This patient has been seen and evaluated by me, Shawna Lugo.  I have discussed this patient with the house staff team including the resident and medical student and I agree with the findings and plan in this note.    I have reviewed today's vital signs, medications, labs and imaging. Shawna Lugo M.D., Ph.D.

## 2019-07-16 NOTE — PLAN OF CARE
Tex continues isolative to room-periodically talking to self-less irritable today-declines offers of assistance-eating meals-polite in interactions

## 2019-07-16 NOTE — PROGRESS NOTES
Continues to isolate in his room. Presents anxious and paranoid. Talking to self in his room. Gets meals and eats in his room. No shower.       07/15/19 2000   Behavioral Health   Hallucinations appears responding;auditory;other (see comment)  (talking to self in room)   Thinking paranoid;delusional;poor concentration;distractable   Orientation situation, disoriented   Memory confabulation   Insight poor   Judgement impaired   Eye Contact out of corner of eyes;into space   Affect tense   Mood anxious   Physical Appearance/Attire disheveled   Hygiene neglected grooming - unclean body, hair, teeth;body odor   1. Wish to be Dead No   2. Non-Specific Active Suicidal Thoughts  No   Activity isolative;withdrawn;refusal   Speech pressured   Psychomotor / Gait balanced;steady   Activities of Daily Living   Hygiene/Grooming prompts  (Refused all)

## 2019-07-16 NOTE — PROGRESS NOTES
Shortly after Tex took the 10 mg of haldol, he was seen in his room, hacking up a lot of mucous. Perhaps he should stay outside room for 15 minutes after medications or else he should be given a liquid medication.

## 2019-07-17 PROCEDURE — 25000132 ZZH RX MED GY IP 250 OP 250 PS 637: Mod: GY | Performed by: STUDENT IN AN ORGANIZED HEALTH CARE EDUCATION/TRAINING PROGRAM

## 2019-07-17 PROCEDURE — 99232 SBSQ HOSP IP/OBS MODERATE 35: CPT | Mod: GC | Performed by: PSYCHIATRY & NEUROLOGY

## 2019-07-17 PROCEDURE — 12400001 ZZH R&B MH UMMC

## 2019-07-17 RX ADMIN — HALOPERIDOL 10 MG: 5 TABLET ORAL at 22:21

## 2019-07-17 ASSESSMENT — ACTIVITIES OF DAILY LIVING (ADL)
HYGIENE/GROOMING: PROMPTS
HYGIENE/GROOMING: PROMPTS

## 2019-07-17 NOTE — PLAN OF CARE
BEHAVIORAL TEAM DISCUSSION    Participants:   Dr. Luog, Attending  Dipti Cobb, LPCC, LADC, CTC  Teresa Zhang MD, PGY-1  Mikie Orozco, MS3  Peyton Dodd, MS3  Mirna Soler RN   Progress: Patient continues to isolate to room. Presents anxious and paranoid. Frequently talking to self in room. PD was revoked, patient continues to display significant MH symptoms requiring hospitalization.  Continued Stay Criteria/Rationale: Requires pending medication management and clinical stabilization  Medical/Physical: The Review of Systems is negative other than noted in the HPI   Precautions:   Behavioral Orders   Procedures     Code 1 - Restrict to Unit     Elopement precautions     Routine Programming     As clinically indicated     Sexual precautions     Single Room     Status 15     Every 15 minutes.   Plan: Will be offered individual therapy as patient family expresses his inability to participate in group programming on the unit due to MH symptoms. Upon discharge the patient will be referred to services as appropriate based on the assessment. Medication changes have been made based on psychiatric assessment.   Rationale for change in precautions or plan: No change

## 2019-07-17 NOTE — PROVIDER NOTIFICATION
Pt still isolates to room, often speaking loudly to self.  Pt accepted Jarvised Haldol, but denies he is benefiting from med.

## 2019-07-17 NOTE — PROGRESS NOTES
"    ----------------------------------------------------------------------------------------------------------  [facility], [place]   Psychiatric Progress Note  Hospital Day #28     Interim History:   The patient's care was discussed with the treatment team and chart notes were reviewed.    Sleep: 6.5hrs  Scheduled Medications: took oral haldol, continued to not take prozac    Staff Report: Continues to isolate to room, periodically talking to self, less irritable today. Denied that he is benefiting from haldol.     Patient Interview: Interviewed from doorway of room. Initially stopped be door, and said \"can you stop by later?\" and after agreeing to come back in a few minutes, could be heard loudly stating in room, \"disrespectful, just so disrespectful.\" Said that he still wants the prozac to be offered. Stated that there was nothing we could help him with. When asked if he would like an individual therapist visit, he stated in a loud voice, \"no.\"      The risks, benefits, alternatives and side effects of any medication changes have been discussed and are understood by the patient and other caregivers.    Review of systems:     ROS was negative unless noted above.          Allergies:     Allergies   Allergen Reactions     Mold      Itchy eyes, congestion     Nkda [No Known Drug Allergies]             Psychiatric Examination:   BP (!) 142/92 (BP Location: Right arm)   Pulse 87   Temp 98.2  F (36.8  C) (Oral)   Resp 16   Ht 1.829 m (6')   SpO2 95%   BMI 42.86 kg/m    Weight is 0 lbs 0 oz  Body mass index is 42.86 kg/m .    Appearance:  poorly groomed, sitting on edge of bed facing the doorway. Had writing in his room that was visible that said \"Polish, Bengali,\" and also had writing on pizza boxes.   Attitude:  evasive and guarded  Eye Contact:  Minimal eye contact  Mood:  \"fine\"  Affect:  mood incongruent, intensity is blunted and constricted mobility; speaks in loud, mocking, tone " at baseline, and then raises voice when frustrated; labile  Speech:  clear, coherent, loud  Psychomotor Behavior:  no evidence of tardive dyskinesia, dystonia, or tics  Thought Process:  disorganized  Associations:  difficult to assess, given patient's short answers  Thought Content:  no evidence of suicidal ideation or homicidal ideation and patient appears to be responding to internal stimuli , likely has auditory hallucinations given self-talk in room  Insight:  poor  Judgment:  poor  Language: speaks fluent english         Labs:   No results found for this or any previous visit (from the past 24 hour(s)).       Assessment    Diagnostic Impression: Tex Garland is a 39 year old male with a past psychiatric history of schizoaffective disorder depressed type and seasonal affective disorder presented due to paranoia, disorganization, and agitation in the context of medication nonadherence. The patient has no known family history of mental illness. Current social factors include unemployment, lives alone, not having a car or phone, father with Parkinson's/Lewy Body dementia. Psychological factors include previous diagnoses of schizoaffective disorder depressed type and seasonal affective disorders. Biological factors include medication non-compliance. Likely diagnosis is schizophrenia given his marked disorganization and paranoia, and lack of strong mood symptoms (other than irritability), although difficult to assess mood symptoms given patient's extreme guardedness and agitation.     Hospital course: Tex Garland was admitted to station 22 NB on a 72 hour hold. Restarting aripiprazole or other antipsychotics was discussed with the patient, but he refused, as he attributed anorgasmia to abilify. Team recommended tapering off fluoxetine due to its adverse side effect, but he refused--since he was declining antipsychotics and therapeutic benefit of fluxoetine is uncertain, it was discontinued .  Due to the need for [id number] in the past and a history of altercations with strangers, and poor hygiene including rotting food in apartment, there is concern for harm to self or to others. Thus, petition for commitment and rodriguez was initiated.  On 6/28 a code was called for patient's worsening agitation (yelling at and swearing at peers, slamming doors, slamming plate in room, slamming toilet seat in room); he took oral olanzapine and ativan, and afterward was calm in his room. He required emergency forced medication (oral olanzapine and ativan) daily starting 6/29 for safety due to his psychotic symptoms. Prozac was re-initiated due to patient request, history of medicine being efficacious for mood. He at times denied wanting the prozac, but requested it continued to be offered in the AM. Rodriguez and commitment order received 7/9. Due to likely akathisia, limited response to zyprexa, and concern for metabolic side effects, zyprexa was discontinued for psychosis, and haldol initiated. Haldol increased from 5mg to 10mg on 7/15 due to continued psychosis.    Discontinued Medications (& Rationale):     Medical course: Tex Garland was brought in by EMS to [facility] ED on 6/19/2019. He was transferred to [facility] for psychiatric evaluation.    Plan   Principal Diagnosis:   # schizophrenia  vs schizoaffective disorder     Secondary psychiatric diagnoses of concern this admission: none     Psychotropic Medications:  Modify:  none     Continue:  - cont haldol to 10mg PO or IM (rodriguez medication)  - cont prozac 20mg daily (due to patient requesting continuing yesterday)  - Acetaminophen 650 mg PRN for pain  - Albuterol inhaler PRN for wheezing, shortness of breath/dyspnea  - Hydroxyzine 25 mg PRN for anxiety  - Lorazepam 0.5 mg PRN for anxiety  - Olanzapine 10 mg PRN for agitation  - Trazodone 50 mg PRN for sleep     Patient will be treated in therapeutic milieu with appropriate individual and group therapies  as described.        Medical diagnoses to be addressed this admission:    none     Data: see above  Consults: none    Legal Status: Nicole  Committed    Safety Assessment:   Behavioral Orders   Procedures     Code 1 - Restrict to Unit     Elopement precautions     Routine Programming     As clinically indicated     Sexual precautions     Single Room     Status 15     Every 15 minutes.       Disposition: unknown pending medication management and clinical stabilization     Teresa Zhang MD   PGY-1      Attestation:  This patient has been seen and evaluated by me, Shawna Lugo.  I have discussed this patient with the house staff team including the resident and medical student and I agree with the findings and plan in this note.    I have reviewed today's vital signs, medications, labs and imaging. Shawna Lugo M.D., Ph.D.

## 2019-07-17 NOTE — PROGRESS NOTES
Continues to isolate in his room. Comes out only for meals. Appears lnot irritable when out of his room, is polite. Responding to internal stim at times while in his room, talking to self at times. No shower despite prompts. Blunted, tense, anxious.     07/17/19 1300   Behavioral Health   Hallucinations appears responding;auditory;other (see comment)  (Talking to self at times in his room)   Thinking distractable;poor concentration   Orientation person: oriented;place: oriented   Insight poor   Judgement impaired   Affect blunted, flat;tense   Mood anxious   Physical Appearance/Attire disheveled   Hygiene neglected grooming - unclean body, hair, teeth   1. Wish to be Dead No   2. Non-Specific Active Suicidal Thoughts  No   Activity isolative;withdrawn   Speech pressured   Psychomotor / Gait balanced;steady   Psycho Education   Type of Intervention structured groups   Response refuses   Activities of Daily Living   Hygiene/Grooming prompts  (Declined all)

## 2019-07-18 PROCEDURE — 12400001 ZZH R&B MH UMMC

## 2019-07-18 PROCEDURE — 25000132 ZZH RX MED GY IP 250 OP 250 PS 637: Mod: GY | Performed by: STUDENT IN AN ORGANIZED HEALTH CARE EDUCATION/TRAINING PROGRAM

## 2019-07-18 PROCEDURE — 99232 SBSQ HOSP IP/OBS MODERATE 35: CPT | Mod: GC | Performed by: PSYCHIATRY & NEUROLOGY

## 2019-07-18 RX ADMIN — HALOPERIDOL 10 MG: 5 TABLET ORAL at 21:26

## 2019-07-18 ASSESSMENT — ACTIVITIES OF DAILY LIVING (ADL)
DRESS: INDEPENDENT
HYGIENE/GROOMING: INDEPENDENT
HYGIENE/GROOMING: INDEPENDENT
LAUNDRY: WITH SUPERVISION
LAUNDRY: WITH SUPERVISION
DRESS: INDEPENDENT
ORAL_HYGIENE: INDEPENDENT
ORAL_HYGIENE: INDEPENDENT

## 2019-07-18 NOTE — PROGRESS NOTES
"    ----------------------------------------------------------------------------------------------------------  [facility], [place]   Psychiatric Progress Note  Hospital Day #29     Interim History:   The patient's care was discussed with the treatment team and chart notes were reviewed.    Sleep: 6hrs  Scheduled Medications: took oral haldol, continued to not take prozac    Staff Report: Continues to isolate to room, comes out for meals. Is polite when outside of room. Talks to self, yelling and pounding in his room for first half of shift. On the phone, stated \"I have to run from the staff and doctors,\" \"they don't give me anything to drink except for these dinky juice things.\"     Patient Interview: Interviewed from doorway of room. Reported that he is doing well, slept well, ate breakfast. Has no complaints. Asked about his [employer] box, said that \"it's a good quality company,\" and agreed that he enjoyed making art on the box (of note, the box included a few words including \"jews\").     The risks, benefits, alternatives and side effects of any medication changes have been discussed and are understood by the patient and other caregivers.    Review of systems:     ROS was negative unless noted above.          Allergies:     Allergies   Allergen Reactions     Mold      Itchy eyes, congestion     Nkda [No Known Drug Allergies]             Psychiatric Examination:   BP (!) 142/92 (BP Location: Right arm)   Pulse 87   Temp 98.2  F (36.8  C) (Oral)   Resp 16   Ht 1.829 m (6')   SpO2 95%   BMI 42.86 kg/m    Weight is 0 lbs 0 oz  Body mass index is 42.86 kg/m .    Appearance:  poorly groomed, standing near bed, facing the doorway  Attitude:  guarded and more cooperative  Eye Contact:  Minimal eye contact, although increased from prior days  Mood:  \"fine\"  Affect:  mood congruent, intensity is blunted and constricted mobility; did not raise voice or use mocking tone--significant change " from days prior   Speech:  clear, coherent  Psychomotor Behavior:  no evidence of tardive dyskinesia, dystonia, or tics  Thought Process:  disorganized  Associations:  difficult to assess, given patient's short answers  Thought Content:  no evidence of suicidal ideation or homicidal ideation and patient appears to be responding to internal stimuli , likely has auditory hallucinations given self-talk in room  Insight:  poor  Judgment:  poor  Language: speaks fluent english         Labs:   No results found for this or any previous visit (from the past 24 hour(s)).       Assessment    Diagnostic Impression: Tex Garland is a 39 year old male with a past psychiatric history of schizoaffective disorder depressed type and seasonal affective disorder presented due to paranoia, disorganization, and agitation in the context of medication nonadherence. The patient has no known family history of mental illness. Current social factors include unemployment, lives alone, not having a car or phone, father with Parkinson's/Lewy Body dementia. Psychological factors include previous diagnoses of schizoaffective disorder depressed type and seasonal affective disorders. Biological factors include medication non-compliance. Likely diagnosis is schizophrenia given his marked disorganization and paranoia, and lack of strong mood symptoms (other than irritability), although difficult to assess mood symptoms given patient's extreme guardedness and agitation.     Hospital course: Tex Garland was admitted to station 22 NB on a 72 hour hold. Restarting aripiprazole or other antipsychotics was discussed with the patient, but he refused, as he attributed anorgasmia to abilify. Team recommended tapering off fluoxetine due to its adverse side effect, but he refused--since he was declining antipsychotics and therapeutic benefit of fluxoetine is uncertain, it was discontinued . Due to the need for [id number] in the  past and a history of altercations with strangers, and poor hygiene including rotting food in apartment, there is concern for harm to self or to others. Thus, petition for commitment and rodriguez was initiated.  On 6/28 a code was called for patient's worsening agitation (yelling at and swearing at peers, slamming doors, slamming plate in room, slamming toilet seat in room); he took oral olanzapine and ativan, and afterward was calm in his room. He required emergency forced medication (oral olanzapine and ativan) daily starting 6/29 for safety due to his psychotic symptoms. Prozac was re-initiated due to patient request, history of medicine being efficacious for mood. He at times denied wanting the prozac, but requested it continued to be offered in the AM. Rodriguez and commitment order received 7/9. Due to likely akathisia, limited response to zyprexa, and concern for metabolic side effects, zyprexa was discontinued for psychosis, and haldol initiated. Haldol increased from 5mg to 10mg on 7/15 due to continued psychosis.    Discontinued Medications (& Rationale):     Medical course: Tex Garland was brought in by EMS to [facility] ED on 6/19/2019. He was transferred to [facility] for psychiatric evaluation.    Plan   Principal Diagnosis:   # schizophrenia  vs schizoaffective disorder     Secondary psychiatric diagnoses of concern this admission: none     Psychotropic Medications:  Modify:  none     Continue:  - cont haldol to 10mg PO or IM (rodriguez medication)  - cont prozac 20mg daily (due to patient requesting continuing yesterday)  - Acetaminophen 650 mg PRN for pain  - Albuterol inhaler PRN for wheezing, shortness of breath/dyspnea  - Hydroxyzine 25 mg PRN for anxiety  - Lorazepam 0.5 mg PRN for anxiety  - Olanzapine 10 mg PRN for agitation  - Trazodone 50 mg PRN for sleep     Patient will be treated in therapeutic milieu with appropriate individual and group therapies as described.        Medical diagnoses to be  addressed this admission:    none     Data: see above  Consults: none    Legal Status: Nicole  Committed    Safety Assessment:   Behavioral Orders   Procedures     Code 1 - Restrict to Unit     Elopement precautions     Routine Programming     As clinically indicated     Sexual precautions     Single Room     Status 15     Every 15 minutes.       Disposition: unknown pending medication management and clinical stabilization     Teresa Zhang MD   PGY-1    Attestation:  This patient has been seen and evaluated by me, Shawna Lugo.  I have discussed this patient with the house staff team including the resident and medical student and I agree with the findings and plan in this note.    I have reviewed today's vital signs, medications, labs and imaging. Shawna Lugo M.D., Ph.D.

## 2019-07-18 NOTE — PROGRESS NOTES
"Yelling and pounding in his room for first half of shift. Out to get his meal, then ate in in his room. On the phone stating, \"I have to run from the staff and DR's\", \"They don't give me anything to drink except for these dinky juice things\"', \"I don't even get to go for walks\". No shower.     07/17/19 1900   Behavioral Health   Hallucinations appears responding;auditory;other (see comment)  (Yelling in his room w/ profanity)   Thinking other (see comment);paranoid;delusional;distractable;poor concentration  (Ideas of persecution)   Orientation person: oriented;place: oriented;situation, disoriented   Memory confabulation   Insight poor   Judgement impaired   Eye Contact out of corner of eyes   Affect tense   Mood anxious   Physical Appearance/Attire disheveled   Hygiene neglected grooming - unclean body, hair, teeth   1. Wish to be Dead No   2. Non-Specific Active Suicidal Thoughts  No   Activity isolative;withdrawn;restless   Speech pressured;rambling   Psychomotor / Gait balanced;steady   Activities of Daily Living   Hygiene/Grooming prompts  (Refused all)     "

## 2019-07-18 NOTE — PLAN OF CARE
"Tex continues guarded-declines one to one or offers of support-provided with pitcher of ice water post report of feeling he is not being provided with enough liquids, \"I'm not going to drink that!\" although did allow pitcher of water to be placed in his room-freq sleeping or resting in bed today-out of room only to obtain trays  "

## 2019-07-19 LAB
ALBUMIN SERPL-MCNC: 3.4 G/DL (ref 3.4–5)
ALBUMIN UR-MCNC: NEGATIVE MG/DL
ALP SERPL-CCNC: 76 U/L (ref 40–150)
ALT SERPL W P-5'-P-CCNC: 50 U/L (ref 0–70)
ANION GAP SERPL CALCULATED.3IONS-SCNC: 8 MMOL/L (ref 3–14)
APPEARANCE UR: CLEAR
AST SERPL W P-5'-P-CCNC: 20 U/L (ref 0–45)
BILIRUB SERPL-MCNC: 0.4 MG/DL (ref 0.2–1.3)
BILIRUB UR QL STRIP: NEGATIVE
BUN SERPL-MCNC: 12 MG/DL (ref 7–30)
CALCIUM SERPL-MCNC: 8.8 MG/DL (ref 8.5–10.1)
CHLORIDE SERPL-SCNC: 104 MMOL/L (ref 94–109)
CO2 SERPL-SCNC: 28 MMOL/L (ref 20–32)
COLOR UR AUTO: YELLOW
CREAT SERPL-MCNC: 0.76 MG/DL (ref 0.66–1.25)
ERYTHROCYTE [DISTWIDTH] IN BLOOD BY AUTOMATED COUNT: 13.1 % (ref 10–15)
GFR SERPL CREATININE-BSD FRML MDRD: >90 ML/MIN/{1.73_M2}
GLUCOSE SERPL-MCNC: 97 MG/DL (ref 70–99)
GLUCOSE UR STRIP-MCNC: NEGATIVE MG/DL
HCT VFR BLD AUTO: 46.5 % (ref 40–53)
HGB BLD-MCNC: 15.3 G/DL (ref 13.3–17.7)
HGB UR QL STRIP: NEGATIVE
KETONES UR STRIP-MCNC: NEGATIVE MG/DL
LEUKOCYTE ESTERASE UR QL STRIP: NEGATIVE
MCH RBC QN AUTO: 27.8 PG (ref 26.5–33)
MCHC RBC AUTO-ENTMCNC: 32.9 G/DL (ref 31.5–36.5)
MCV RBC AUTO: 84 FL (ref 78–100)
MUCOUS THREADS #/AREA URNS LPF: PRESENT /LPF
NITRATE UR QL: NEGATIVE
PH UR STRIP: 6 PH (ref 5–7)
PLATELET # BLD AUTO: 249 10E9/L (ref 150–450)
POTASSIUM SERPL-SCNC: 3.8 MMOL/L (ref 3.4–5.3)
PROT SERPL-MCNC: 7.1 G/DL (ref 6.8–8.8)
RBC # BLD AUTO: 5.51 10E12/L (ref 4.4–5.9)
RBC #/AREA URNS AUTO: 2 /HPF (ref 0–2)
SODIUM SERPL-SCNC: 140 MMOL/L (ref 133–144)
SOURCE: ABNORMAL
SP GR UR STRIP: 1.01 (ref 1–1.03)
UROBILINOGEN UR STRIP-MCNC: NORMAL MG/DL (ref 0–2)
WBC # BLD AUTO: 10.8 10E9/L (ref 4–11)
WBC #/AREA URNS AUTO: <1 /HPF (ref 0–5)

## 2019-07-19 PROCEDURE — 93010 ELECTROCARDIOGRAM REPORT: CPT | Performed by: INTERNAL MEDICINE

## 2019-07-19 PROCEDURE — 85027 COMPLETE CBC AUTOMATED: CPT | Performed by: PHYSICIAN ASSISTANT

## 2019-07-19 PROCEDURE — 85610 PROTHROMBIN TIME: CPT | Performed by: PHYSICIAN ASSISTANT

## 2019-07-19 PROCEDURE — 81001 URINALYSIS AUTO W/SCOPE: CPT | Performed by: PHYSICIAN ASSISTANT

## 2019-07-19 PROCEDURE — 99232 SBSQ HOSP IP/OBS MODERATE 35: CPT | Mod: GC | Performed by: PSYCHIATRY & NEUROLOGY

## 2019-07-19 PROCEDURE — 25000132 ZZH RX MED GY IP 250 OP 250 PS 637: Mod: GY | Performed by: STUDENT IN AN ORGANIZED HEALTH CARE EDUCATION/TRAINING PROGRAM

## 2019-07-19 PROCEDURE — 36415 COLL VENOUS BLD VENIPUNCTURE: CPT | Performed by: PHYSICIAN ASSISTANT

## 2019-07-19 PROCEDURE — 93005 ELECTROCARDIOGRAM TRACING: CPT

## 2019-07-19 PROCEDURE — 99222 1ST HOSP IP/OBS MODERATE 55: CPT | Performed by: PHYSICIAN ASSISTANT

## 2019-07-19 PROCEDURE — 12400001 ZZH R&B MH UMMC

## 2019-07-19 PROCEDURE — 99207 ZZC CONSULT E&M CHANGED TO INITIAL LEVEL: CPT | Performed by: PHYSICIAN ASSISTANT

## 2019-07-19 PROCEDURE — 80053 COMPREHEN METABOLIC PANEL: CPT | Performed by: PHYSICIAN ASSISTANT

## 2019-07-19 RX ORDER — AMLODIPINE BESYLATE 2.5 MG/1
5 TABLET ORAL DAILY
Status: DISCONTINUED | OUTPATIENT
Start: 2019-07-19 | End: 2019-07-30 | Stop reason: HOSPADM

## 2019-07-19 RX ADMIN — HALOPERIDOL 10 MG: 5 TABLET ORAL at 21:13

## 2019-07-19 ASSESSMENT — ACTIVITIES OF DAILY LIVING (ADL)
DRESS: INDEPENDENT;STREET CLOTHES
HYGIENE/GROOMING: INDEPENDENT
LAUNDRY: WITH SUPERVISION
DRESS: INDEPENDENT
ORAL_HYGIENE: INDEPENDENT
HYGIENE/GROOMING: INDEPENDENT
ORAL_HYGIENE: INDEPENDENT

## 2019-07-19 NOTE — PROGRESS NOTES
"    ----------------------------------------------------------------------------------------------------------  [facility], [place]   Psychiatric Progress Note  Hospital Day #30     Interim History:   The patient's care was discussed with the treatment team and chart notes were reviewed.    Sleep: 7hrs  Scheduled Medications: took oral haldol, continued to not take prozac    Staff Report: Tex continues to be guarded. Declines 1:1. Provided with pitcher of water after saying that he doesn't feel like he is being provided with enough water, and said \"I'm not going to drink that!\" No yelling at end of shift, appeared less tense. He came up for meds on his own.     Patient Interview: Interviewed from doorway of room. Reports that he is doing well: \"I slept well, ate breakfast.\" Writer commented on the writings in his room that had names of sports written on them in colored markers, asked patient what his favorite sport was, he said \"Baseball; the twins are doing well this year.\" Asked if patient would want to watch the [employer] game this weekend, which he said \"probably,\" encouraged him to watch the game this evening or tomorrow out in the milieu.     The risks, benefits, alternatives and side effects of any medication changes have been discussed and are understood by the patient and other caregivers.    Review of systems:     ROS was negative unless noted above.          Allergies:     Allergies   Allergen Reactions     Mold      Itchy eyes, congestion     Nkda [No Known Drug Allergies]             Psychiatric Examination:   BP (!) 151/109   Pulse 73   Temp 98.3  F (36.8  C) (Tympanic)   Resp 17   Ht 1.829 m (6')   SpO2 95%   BMI 42.86 kg/m    Weight is 0 lbs 0 oz  Body mass index is 42.86 kg/m .    Appearance:  poorly groomed, standing near bed, facing the doorway  Attitude:  guarded and more cooperative, similar to yesterday  Eye Contact:  Minimal eye contact, although increased from " "prior days  Mood:  \"fine\"  Affect:  mood congruent, intensity is blunted and constricted mobility; did not raise voice or use mocking tone--significant change from days prior   Speech:  clear, coherent  Psychomotor Behavior:  no evidence of tardive dyskinesia, dystonia, or tics  Thought Process:  disorganized  Associations:  difficult to assess, given patient's short answers  Thought Content:  no evidence of suicidal ideation or homicidal ideation and patient appears to be responding to internal stimuli , likely has auditory hallucinations given self-talk in room  Insight:  poor  Judgment:  poor  Language: speaks fluent english         Labs:   No results found for this or any previous visit (from the past 24 hour(s)).       Assessment    Diagnostic Impression: Tex Garland is a 39 year old male with a past psychiatric history of schizoaffective disorder depressed type and seasonal affective disorder presented due to paranoia, disorganization, and agitation in the context of medication nonadherence. The patient has no known family history of mental illness. Current social factors include unemployment, lives alone, not having a car or phone, father with Parkinson's/Lewy Body dementia. Psychological factors include previous diagnoses of schizoaffective disorder depressed type and seasonal affective disorders. Biological factors include medication non-compliance. Likely diagnosis is schizophrenia given his marked disorganization and paranoia, and lack of obvious mood symptoms (other than irritability), although difficult to assess mood symptoms given patient's guardedness.     Hospital course: Tex Garland was admitted to station [facility] on a 72 hour hold. Restarting aripiprazole or other antipsychotics was discussed with the patient, but he refused, as he attributed anorgasmia to abilify. Team recommended tapering off fluoxetine due to its adverse side effect, but he refused--since he was " declining antipsychotics and therapeutic benefit of fluxoetine is uncertain, it was discontinued . Due to the need for [id number] in the past and a history of altercations with strangers, and poor hygiene including rotting food in apartment, there is concern for harm to self or to others. Thus, petition for commitment and rodriguez was initiated.  On 6/28 a code was called for patient's worsening agitation (yelling at and swearing at peers, slamming doors, slamming plate in room, slamming toilet seat in room); he took oral olanzapine and ativan, and afterward was calm in his room. He required emergency forced medication (oral olanzapine and ativan) daily starting 6/29 for safety due to his psychotic symptoms. Prozac was re-initiated due to patient request, history of medicine being efficacious for mood. He at times denied wanting the prozac, but requested it continued to be offered in the AM. Rodriguez and commitment order received 7/9. Due to likely akathisia, limited response to zyprexa, and concern for metabolic side effects, zyprexa was discontinued for psychosis, and haldol initiated. Haldol increased from 5mg to 10mg on 7/15 due to continued psychosis.    Discontinued Medications (& Rationale):     Medical course: Tex Garland was brought in by EMS to [facility] ED on 6/19/2019. He was transferred to [facility] for psychiatric evaluation.    Plan   Principal Diagnosis:   # schizophrenia  vs schizoaffective disorder     Secondary psychiatric diagnoses of concern this admission: none     Psychotropic Medications:  Modify:  none     Continue:  - cont haldol to 10mg PO or IM (rodriguez medication)  - cont prozac 20mg daily (due to patient requesting continuing yesterday)  - Acetaminophen 650 mg PRN for pain  - Albuterol inhaler PRN for wheezing, shortness of breath/dyspnea  - Hydroxyzine 25 mg PRN for anxiety  - Lorazepam 0.5 mg PRN for anxiety  - Olanzapine 10 mg PRN for agitation  - Trazodone 50 mg PRN for  sleep     Patient will be treated in therapeutic milieu with appropriate individual and group therapies as described.        Medical diagnoses to be addressed this admission:    #HTN  -medicine consulted, rec amlodipine 5mg daily  -UA, EKG pending     Data: see above  Consults: internal medicine for HTN    Legal Status: Nicole  Committed    Safety Assessment:   Behavioral Orders   Procedures     Code 1 - Restrict to Unit     Elopement precautions     Routine Programming     As clinically indicated     Sexual precautions     Single Room     Status 15     Every 15 minutes.       Disposition: unknown pending medication management and clinical stabilization     Teresa Zhang MD   PGY-1    Attestation:  This patient has been seen and evaluated by me, Shawna Lugo.  I have discussed this patient with the house staff team including the resident and medical student and I agree with the findings and plan in this note.    I have reviewed today's vital signs, medications, labs and imaging. Shawna Lugo M.D., Ph.D.

## 2019-07-19 NOTE — CONSULTS
Internal Medicine Initial Visit      Tex Garland MRN# [id number]   YOB: 1980 Age: 39 year old   Date of Admission: 6/19/2019  [facility]    Referring Provider: Behavioral Health - Jake Phillip MD  Reason for Visit: General Medical Evaluation          Assessment and Recommendations:   Tex Garland is a 39 year old man with a history of schizoaffective disorder depressed type and seasonal affective disorder who was admitted on 6/19/2019 to station [facility] due to paranoia and disorganization. Internal Medicine consultation was ordered by Dr. Teresa Zhang  for hypertension.        Hypertension:   Pt has hx of HTN, previously started on Metoprolol succinate 25mg PO in 2011. However, patient denies a history of taking this medication. His mother was unclear. Was not on antihypertensive prior to admission. BP have been persistently elevated during admission with BP today 135/95 however, has been elevated with SBP 160s. No chest pain, SOB, FINLEY, palpations, or peripheral edema. -CBC, CMP obtained. Renal function stable. Reviewed lipid panel on admission which was normal with borderline LDL 98.  Given that he has been persistently hypertensive while in the hospital, will start antihypertensive at this time with close monitoring.   -Will start Amlodipine 5mg daily. Discussed w/ patient and his mother and discussed side effects.   -UA to assess for proteinuria   -EKG for cardiovascular screening  -Discussed lifestyle modifications including weight loss, low sodium/mediterranian diet, and routine exercise to improve blood pressure.  -Will continue to monitor BP peripherally   -Will need close follow-up after discharge with PCP.     Addendum: ECG reviewed. NSR without any acute ST-T wave changes. No LVH. QTc WNL.     Medicine will continue to follow-up on UA and ECG and monitor BP peripherally, please page with any additional concerns.     Amanda Monahan,  RICKY  Hospitalist Service   Pager: [phone]     Reason for Admission:   For paranoia and disorganization         History of Present Illness:   History is obtained from the patient and medical record.     Tex Garland is a 39 year old man with a history of schizoaffective disorder depressed type and seasonal affective disorder who was admitted on 6/19/2019 to station [facility] due to paranoia and disorganization. Internal Medicine consultation was ordered by Dr. Teresa Zhang  for hypertension.        Patient reports he has an elevated blood pressure because he is in the hospital and is going through a stressful time. He denies any chest pain, SOB, FINLEY, palpitations or peripheral edema. No headache, change in vision, nausea/vomiting. He denies any tobacco use, HLD, or T2DM.     Per chart review, patient was previously prescribed Metoprolol succinate 25mg daily in 2011. However, after further discussion with patient and his mother, he has not taken anything for his BP in years and he was unaware of this medication. No other concerns at this time.           Review of Systems:    ROS: 10 point ROS neg other than the symptoms noted above in the HPI.            Past Medical History:   Reviewed and updated in Epic.  Past Medical History:   Diagnosis Date     Depressive disorder      Uncomplicated asthma              Past Surgical History:   Reviewed and updated in Epic.  No past surgical history on file.          Social History:     Social History     Tobacco Use     Smoking status: Former Smoker   Substance Use Topics     Alcohol use: Not Currently     Drug use: Not Currently             Family History:   Reviewed and updated in Epic.  No family history on file.          Allergies:     Allergies   Allergen Reactions     Mold      Itchy eyes, congestion     Nkda [No Known Drug Allergies]              Medications:     Medications Prior to Admission   Medication Sig Dispense Refill Last Dose     FLUoxetine  (PROZAC) 10 MG capsule Take 10 mg by mouth daily Take with 20 mg capsule to make 30 mg morning dose   6/19/2019 at Unknown time     FLUoxetine (PROZAC) 20 MG capsule Take 20 mg by mouth daily Take with 10 mg capsule to make 30 mg morning dose.   6/19/2019 at Unknown time     multivitamin w/minerals (THERA-VIT-M) tablet Take 1 tablet by mouth daily   6/18/2019     albuterol (VENTOLIN HFA) 108 (90 Base) MCG/ACT inhaler Inhale 2-4 puffs into the lungs every 6 hours as needed for shortness of breath / dyspnea    More than a month at Unknown time     LORazepam (ATIVAN) 0.5 MG tablet Take 0.5 mg by mouth every 6 hours as needed for anxiety    More than a month at Unknown time        Current Facility-Administered Medications   Medication     acetaminophen (TYLENOL) tablet 650 mg     albuterol (PROAIR HFA/PROVENTIL HFA/VENTOLIN HFA) 108 (90 Base) MCG/ACT inhaler 2-4 puff     haloperidol lactate (HALDOL) injection 5 mg    And     diphenhydrAMINE (BENADRYL) injection 50 mg     FLUoxetine (PROzac) capsule 20 mg     haloperidol (HALDOL) tablet 10 mg    Or     haloperidol lactate (HALDOL) injection 10 mg     hydrOXYzine (ATARAX) tablet 25 mg     LORazepam (ATIVAN) injection 2 mg     LORazepam (ATIVAN) tablet 1 mg     traZODone (DESYREL) tablet 50 mg            Physical Exam:   Blood pressure (!) 135/95, pulse 93, temperature 98.2  F (36.8  C), temperature source Oral, resp. rate 17, height 1.829 m (6'), SpO2 96 %.  Body mass index is 42.86 kg/m .  GENERAL: Alert and oriented x 3. Anxious. Obese. Cooperative.   HEENT: Anicteric sclera. Mucous membranes moist.   CV: RRR. S1, S2. No murmurs appreciated.   RESPIRATORY: Effort normal on room air. Lungs CTAB with no wheezing, rales, rhonchi.   GI: Abdomen soft, non distended, non tender. No guarding, rigidity or rebound tenderness.  MUSCULOSKELETAL: No joint swelling or tenderness.  NEUROLOGICAL: No focal deficits. Moves all extremities.   EXTREMITIES: No peripheral edema. Intact  bilateral pedal pulses.   SKIN: No jaundice. No rashes.           Data:   CBC:  Recent Labs   Lab Test 07/19/19  1154   WBC 10.8   RBC 5.51   HGB 15.3   HCT 46.5   MCV 84   MCH 27.8   MCHC 32.9   RDW 13.1          CMP:  Recent Labs   Lab Test 07/19/19  1154      POTASSIUM 3.8   CHLORIDE 104   STACEY 8.8   CO2 28   BUN 12   CR 0.76   GLC 97   AST 20   ALT 50   BILITOTAL 0.4   ALBUMIN 3.4   PROTTOTAL 7.1   ALKPHOS 76       TSH:  TSH   Date Value Ref Range Status   06/20/2019 1.23 0.40 - 4.00 mU/L Final         Unresulted Labs Ordered in the Past 30 Days of this Admission     No orders found from 5/20/2019 to 6/20/2019.

## 2019-07-19 NOTE — PROGRESS NOTES
Pt isolative, withdrawn to room.  Only visible in milieu to receive meal trays and talk on the phone.  Pt's mother came to visit.  No SI/SIB or medication side effects observed.         07/19/19 1400   Behavioral Health   Hallucinations appears responding   Thinking distractable;delusional;paranoid   Orientation person: oriented;place: oriented   Memory confabulation   Insight poor   Judgement impaired   Eye Contact out of corner of eyes   Affect tense   Mood anxious   Physical Appearance/Attire disheveled   Hygiene neglected grooming - unclean body, hair, teeth   Suicidality other (see comments)  (none observed)   1. Wish to be Dead No   2. Non-Specific Active Suicidal Thoughts  No   Self Injury other (see comment)  (none observed)   Activity isolative;withdrawn   Speech clear;coherent   Medication Sensitivity no observed side effects   Psychomotor / Gait balanced;steady   Psycho Education   Type of Intervention 1:1 intervention   Response observes from a distance   Activities of Daily Living   Hygiene/Grooming independent   Oral Hygiene independent   Dress independent;street clothes   Laundry with supervision   Room Organization independent

## 2019-07-19 NOTE — PLAN OF CARE
Patient remains isolative and withdrawn in his room. No yelling out until the end of the shift. Appeared less tense when he came out for meals. He came up for meds on his own. He denies SI and SIB. Mili Lozano RN

## 2019-07-20 PROCEDURE — 25000132 ZZH RX MED GY IP 250 OP 250 PS 637: Mod: GY | Performed by: PHYSICIAN ASSISTANT

## 2019-07-20 PROCEDURE — 12400001 ZZH R&B MH UMMC

## 2019-07-20 PROCEDURE — 25000132 ZZH RX MED GY IP 250 OP 250 PS 637: Mod: GY | Performed by: STUDENT IN AN ORGANIZED HEALTH CARE EDUCATION/TRAINING PROGRAM

## 2019-07-20 RX ADMIN — HALOPERIDOL 10 MG: 5 TABLET ORAL at 20:58

## 2019-07-20 RX ADMIN — AMLODIPINE BESYLATE 5 MG: 2.5 TABLET ORAL at 09:43

## 2019-07-20 ASSESSMENT — ACTIVITIES OF DAILY LIVING (ADL)
ORAL_HYGIENE: INDEPENDENT
LAUNDRY: WITH SUPERVISION
HYGIENE/GROOMING: INDEPENDENT;PROMPTS
DRESS: INDEPENDENT

## 2019-07-20 NOTE — PROVIDER NOTIFICATION
"Pt reports happy re: getting single room:\"At least I can't get a roommate.\"   Pt again took dinner tray and briskly walked back to room. Pt does not interact with peers.   Pts hands still shakey at times.  "

## 2019-07-20 NOTE — PROGRESS NOTES
Brief Medicine Note    Medicine following peripherally for follow-up of UA and BP monitoring. UA w/o proteinuria.     Amlodipine 5mg was started this AM. BP this afternoon reviewed at 135/88. No acute medication changes today.     Will continue to monitor BP peripherally. Please page with any additional questions or concerns.     Amanda Monahan PA-C  Hospitalist Service  Pager [phone]

## 2019-07-20 NOTE — PROGRESS NOTES
Pt had productive day, shaved and did laundry on day shift,. Pt visited with mom and played cards and washed self in room. Pt in pleasant mood. Remains isolative, but appears a little less anxious when coming out of rooms to get things. Pt came out to get scheduled meds this evening. While checking in with writer pt had poor eye contact and extremely pressured speech. Pt agree they are feeling more comfortabale on unit, state the main reason for this was adjusting to not having caffein when they first got here.     Pt denies any medication side effects, but is unhappy about being on medications and having blood pressure mediation added. Pt states they have a gut feeling that they will start having side effects that will cause their body and mind to fall apart. Pt references having sexual dysfunction while on Abilify. Pt states they pray about this.     Pt goes on to state that they feel that they should not have any antipsychotic medication and denies having ever heard voices in their life. Pt states that everyone in a psych [facility] had to be diagnosed with more than just anxiety or depression or else doctors would not be able to keep people here. States that everyone is diagnoses with schizoaffective disorder. Continues that doctors cant diagnose people with chronic schizophrenia because that would be going to far. Pt does endorse that the has anxiety and depression.         Pt continues to be paranoid, and disoriented in their perception of events. Pt angry that they are here, states that it is total bullshit. Upset they are missing summer, and that they want to be out of here by August. Frustrated that he has to stay in his room with nothing to do, however when asked pt states that they are comfortable coming out of room into milieu. States reason for not coming out of room is that there is not anythign good on TV as well as not liking staff. Pt clarifies that they like this writer and about 20% of the staff.  "Frustrated with doctors, feel they only talk to him about two minutes per day, and do not ask his opinions on medication. States he wants to give them a piece of his mind. Also states his  and the doctors are lying to him and that he just needs his court date.     Pt complained that [employer] game was not on today, and when reminded that staff had asked him if he wanted to watch the [employer] game and he had declined, began rambling and explaining that the game started at 6, \"and that's before evening shift gets here\" and then began talking about the meal trays and that process.       Pt thanked writer for talking with them.      07/19/19 2200   Behavioral Health   Hallucinations appears responding   Thinking delusional;paranoid   Orientation person: oriented;place: oriented   Memory confabulation   Insight poor   Judgement impaired   Eye Contact into space   Affect tense   Mood anxious;depressed   Physical Appearance/Attire disheveled   Hygiene well groomed   Suicidality other (see comments)  (denies )   1. Wish to be Dead No   2. Non-Specific Active Suicidal Thoughts  No   Self Injury other (see comment)  (denies )   Activity isolative   Speech pressured   Medication Sensitivity no observed side effects   Psychomotor / Gait balanced;steady   Psycho Education   Type of Intervention 1:1 intervention   Response participates, initiates socially appropriate   Hours 0.5   Treatment Detail check in    Activities of Daily Living   Hygiene/Grooming independent   Oral Hygiene independent   Dress independent   Room Organization independent   Activity   Activity Assistance Provided independent         "

## 2019-07-21 PROCEDURE — 25000132 ZZH RX MED GY IP 250 OP 250 PS 637: Mod: GY | Performed by: STUDENT IN AN ORGANIZED HEALTH CARE EDUCATION/TRAINING PROGRAM

## 2019-07-21 PROCEDURE — 12400001 ZZH R&B MH UMMC

## 2019-07-21 PROCEDURE — 25000132 ZZH RX MED GY IP 250 OP 250 PS 637: Mod: GY | Performed by: PHYSICIAN ASSISTANT

## 2019-07-21 RX ADMIN — AMLODIPINE BESYLATE 5 MG: 2.5 TABLET ORAL at 09:35

## 2019-07-21 RX ADMIN — HALOPERIDOL 10 MG: 5 TABLET ORAL at 20:17

## 2019-07-21 NOTE — PROGRESS NOTES
Patient remains withdrawn and isolative to room. Eats all meals in room. Fast gait when out in milieu. Family visiting today.         07/21/19 1130   Behavioral Health   Hallucinations appears responding   Thinking paranoid;distractable   Orientation situation, disoriented;person: oriented;place: oriented   Memory baseline memory   Insight poor   Judgement impaired   Affect tense;irritable   Mood anxious   Physical Appearance/Attire   (Improved.)   Hygiene   (Needs prompting.)   1. Wish to be Dead No   Activity withdrawn;isolative   Speech pressured

## 2019-07-22 LAB — INTERPRETATION ECG - MUSE: NORMAL

## 2019-07-22 PROCEDURE — 25000132 ZZH RX MED GY IP 250 OP 250 PS 637: Mod: GY | Performed by: PHYSICIAN ASSISTANT

## 2019-07-22 PROCEDURE — 12400001 ZZH R&B MH UMMC

## 2019-07-22 PROCEDURE — 99232 SBSQ HOSP IP/OBS MODERATE 35: CPT | Mod: GC | Performed by: PSYCHIATRY & NEUROLOGY

## 2019-07-22 PROCEDURE — 25000132 ZZH RX MED GY IP 250 OP 250 PS 637: Mod: GY | Performed by: STUDENT IN AN ORGANIZED HEALTH CARE EDUCATION/TRAINING PROGRAM

## 2019-07-22 RX ORDER — HALOPERIDOL 5 MG/ML
10 INJECTION INTRAMUSCULAR DAILY
Status: DISCONTINUED | OUTPATIENT
Start: 2019-07-22 | End: 2019-07-29

## 2019-07-22 RX ORDER — HALOPERIDOL 5 MG/1
10 TABLET ORAL DAILY
Status: DISCONTINUED | OUTPATIENT
Start: 2019-07-22 | End: 2019-07-29

## 2019-07-22 RX ORDER — HALOPERIDOL 5 MG/ML
10 INJECTION INTRAMUSCULAR DAILY
Status: DISCONTINUED | OUTPATIENT
Start: 2019-07-22 | End: 2019-07-22

## 2019-07-22 RX ADMIN — HALOPERIDOL 10 MG: 5 TABLET ORAL at 21:37

## 2019-07-22 RX ADMIN — AMLODIPINE BESYLATE 5 MG: 2.5 TABLET ORAL at 09:46

## 2019-07-22 ASSESSMENT — ACTIVITIES OF DAILY LIVING (ADL)
HYGIENE/GROOMING: PROMPTS
HYGIENE/GROOMING: INDEPENDENT;PROMPTS
DRESS: INDEPENDENT
DRESS: STREET CLOTHES;SCRUBS (BEHAVIORAL HEALTH);INDEPENDENT
ORAL_HYGIENE: PROMPTS
ORAL_HYGIENE: INDEPENDENT

## 2019-07-22 NOTE — PROGRESS NOTES
Pt was isolative and withdrawn to their room, observed pacing and responding to stimuli. Pt was irritable on approach at times, ate meals in their room. Pt became irritable when staff questioned them about bringing a chair to their room. They stated the chair was for their mother who about to visit. Pt became irritable with staff when entered their room for room checks, and when staff asked if they need anything while they were in the lounge.      07/22/19 1424   Behavioral Health   Hallucinations denies / not responding to hallucinations   Thinking distractable;paranoid   Orientation person: oriented;place: oriented   Memory baseline memory   Insight poor   Judgement impaired   Eye Contact at examiner   Affect tense;irritable   Mood anxious   Physical Appearance/Attire attire appropriate to age and situation   Hygiene neglected grooming - unclean body, hair, teeth   1. Wish to be Dead No   2. Non-Specific Active Suicidal Thoughts  No   Activity withdrawn;isolative   Speech pressured   Medication Sensitivity no stated side effects;no observed side effects   Psychomotor / Gait fast;steady   Activities of Daily Living   Hygiene/Grooming prompts   Oral Hygiene prompts   Dress independent   Room Organization prompts

## 2019-07-22 NOTE — PROGRESS NOTES
Brief Medicine Note  July 22, 2019    Medicine monitoring BPs peripherally. Norvasc 5mg started 7/20 with improvement in BPs since. SBP 130s over past 48h. Recommend continuing norvasc 5mg daily. Follow up with PCP as outpatient. If SBP persistently >150 or DBP persistently >100, then please contact medicine. Medicine will sign off.    Michelle Grande PA-C

## 2019-07-22 NOTE — PROGRESS NOTES
"    ----------------------------------------------------------------------------------------------------------  [facility], [place]   Psychiatric Progress Note  Hospital Day #33     Interim History:   The patient's care was discussed with the treatment team and chart notes were reviewed.    Sleep: 6, 5.75 hrs  Scheduled Medications: took oral haldol and amlodipine, continued to not take prozac    Staff Report: Talked on the phone, came out to get meal tray. Shaved and did laundry on day shift on Saturday. Spoke at length with staff on Saturday, told him that he feels that he doesn't need antipsychotic medication, thinks that everyone on a psych salazar is diagnosed with schizoaffective disorder, so that doctors can keep patients in the hospital. Visit with mom went well. Took shower, sat in lounge watching [employer] game last night.     Patient Interview: Interviewed from doorway of room. Reports having watched the [employer] game last night. Said that he usually doesn't like going out to the milieu because the location of the TV isn't ideal--he reports liking having a quieter area for watching TV with less distractions. Also said he usually doesn't like what's on the TV. Says that he doesn't like that his new room is smaller, and dislikes that the bathroom is smaller and that he doesn't have a second bed. Also described that he doesn't feel like he needs to be here, and that his blood pressure \"has been perfect 100 times.\" Said that he showered a couple times this weekend, which team stated was very encouraging.    Spoke with Tex's mother in the afternoon: She reports that Tex is generally doing better, although today she said he was more irritable. Is concerned that he has so little insight into illness. Of note, she reports that his OP psychiatrist stopped his abilify, not that Tex stopped it himself. Also, reports that Tex was still living and driving independently prior to this " "hospitalization, thinks that he will push back on any IRTS facility referral.     The risks, benefits, alternatives and side effects of any medication changes have been discussed and are understood by the patient and other caregivers.    Review of systems:     ROS was negative unless noted above.          Allergies:     Allergies   Allergen Reactions     Mold      Itchy eyes, congestion     Nkda [No Known Drug Allergies]             Psychiatric Examination:   /86   Pulse 93   Temp 98.2  F (36.8  C) (Oral)   Resp 14   Ht 1.829 m (6')   SpO2 95%   BMI 42.86 kg/m    Weight is 0 lbs 0 oz  Body mass index is 42.86 kg/m .    Appearance:  Improved grooming, sitting on bed facing the window  Attitude:  more cooperative, more cooperative and open than on previous days  Eye Contact:  Fair eye contact, increased from prior days  Mood:  \"fine\"  Affect:  mood congruent, intensity is blunted and constricted mobility; did not use mocking tone like earlier in admission  Speech:  clear, coherent, loud  Psychomotor Behavior:  no evidence of tardive dyskinesia, dystonia, or tics, did have shaking concerning for parkinsonian tremor  Thought Process:  linear, goal oriented and illogical  Associations:  no loose associations  Thought Content:  no evidence of suicidal ideation or homicidal ideation and patient appears to be responding to internal stimuli given self-talk in room  Insight:  poor  Judgment:  poor  Language: speaks fluent english         Labs:   No results found for this or any previous visit (from the past 24 hour(s)).       Assessment    Diagnostic Impression: Tex Garland is a 39 year old male with a past psychiatric history of schizoaffective disorder depressed type and seasonal affective disorder presented due to paranoia, disorganization, and agitation in the context of medication nonadherence. The patient has no known family history of mental illness. Current social factors include unemployment, " lives alone, not having a car or phone, father with Parkinson's/Lewy Body dementia. Psychological factors include previous diagnoses of schizoaffective disorder depressed type and seasonal affective disorders. Biological factors include medication non-compliance. Likely diagnosis is schizophrenia given his marked disorganization and paranoia, and lack of obvious mood symptoms (other than irritability), although difficult to assess mood symptoms given patient's guardedness.     Hospital course: Tex Garland was admitted to station 22 NB on a 72 hour hold. Restarting aripiprazole or other antipsychotics was discussed with the patient, but he refused, as he attributed anorgasmia to abilify. Team recommended tapering off fluoxetine due to its adverse side effect, but he refused--since he was declining antipsychotics and therapeutic benefit of fluxoetine is uncertain, it was discontinued . Due to the need for [id number] in the past and a history of altercations with strangers, and poor hygiene including rotting food in apartment, there is concern for harm to self or to others. Thus, petition for commitment and nicole was initiated.  On 6/28 a code was called for patient's worsening agitation (yelling at and swearing at peers, slamming doors, slamming plate in room, slamming toilet seat in room); he took oral olanzapine and ativan, and afterward was calm in his room. He required emergency forced medication (oral olanzapine and ativan) daily starting 6/29 for safety due to his psychotic symptoms. Prozac was re-initiated due to patient request, history of medicine being efficacious for mood. He at times denied wanting the prozac, but requested it continued to be offered in the AM. Nicole and commitment order received 7/9. Due to likely akathisia, limited response to zyprexa, and concern for metabolic side effects, zyprexa was discontinued for psychosis, and haldol initiated. Haldol increased from 5mg to  10mg on 7/15 due to continued psychosis. Significant improvement with 10mg haldol--able to be present in the milieu, having longer and less guarded conversations with staff&treatment team, and began to shower and engage in other ADLs.     Discontinued Medications (& Rationale):     Medical course: Tex Garland was brought in by EMS to [facility] ED on 6/19/2019. He was transferred to [facility] for psychiatric evaluation.    Plan   Principal Diagnosis:   # schizophrenia  vs schizoaffective disorder     Secondary psychiatric diagnoses of concern this admission: none     Psychotropic Medications:  Modify:  none     Continue:  - cont haldol to 10mg PO or IM (nicole medication)  - cont prozac 20mg daily (due to patient requesting continuing yesterday)  - Acetaminophen 650 mg PRN for pain  - Albuterol inhaler PRN for wheezing, shortness of breath/dyspnea  - Hydroxyzine 25 mg PRN for anxiety  - Lorazepam 0.5 mg PRN for anxiety  - Olanzapine 10 mg PRN for agitation  - Trazodone 50 mg PRN for sleep     Patient will be treated in therapeutic milieu with appropriate individual and group therapies as described.        Medical diagnoses to be addressed this admission:    #HTN  -medicine consulted, rec amlodipine 5mg daily  -UA, EKG pending     Data: see above  Consults: internal medicine for HTN    Legal Status: Nicole  Committed    Safety Assessment:   Behavioral Orders   Procedures     Code 1 - Restrict to Unit     Elopement precautions     Routine Programming     As clinically indicated     Sexual precautions     Single Room     Status 15     Every 15 minutes.       Disposition: unknown pending medication management and clinical stabilization     Teresa Zhang MD   PGY-1    Attestation:  This patient has been seen and evaluated by me, Shawna Lugo.  I have discussed this patient with the house staff team including the resident and medical student and I agree with the findings and plan in this note.    I have reviewed  today's vital signs, medications, labs and imaging. Shawna Lugo M.D., Ph.D.

## 2019-07-22 NOTE — PROVIDER NOTIFICATION
Pt took shower and sat in lounge for a while watching ball game.   Pt friendly but chari in interaction with writer.  Pt took dinner to room, but returned empty tray.  Pt came to nurses' station for meds.

## 2019-07-23 PROCEDURE — 25000132 ZZH RX MED GY IP 250 OP 250 PS 637: Mod: GY | Performed by: PHYSICIAN ASSISTANT

## 2019-07-23 PROCEDURE — 99231 SBSQ HOSP IP/OBS SF/LOW 25: CPT | Mod: GC | Performed by: PSYCHIATRY & NEUROLOGY

## 2019-07-23 PROCEDURE — 12400001 ZZH R&B MH UMMC

## 2019-07-23 PROCEDURE — 25000132 ZZH RX MED GY IP 250 OP 250 PS 637: Mod: GY | Performed by: STUDENT IN AN ORGANIZED HEALTH CARE EDUCATION/TRAINING PROGRAM

## 2019-07-23 RX ADMIN — HALOPERIDOL 10 MG: 5 TABLET ORAL at 21:13

## 2019-07-23 RX ADMIN — AMLODIPINE BESYLATE 5 MG: 2.5 TABLET ORAL at 09:34

## 2019-07-23 ASSESSMENT — ACTIVITIES OF DAILY LIVING (ADL)
HYGIENE/GROOMING: PROMPTS
LAUNDRY: UNABLE TO COMPLETE
DRESS: INDEPENDENT;SCRUBS (BEHAVIORAL HEALTH)
ORAL_HYGIENE: PROMPTS

## 2019-07-23 ASSESSMENT — MIFFLIN-ST. JEOR: SCORE: 2374.56

## 2019-07-23 NOTE — PROGRESS NOTES
"    ----------------------------------------------------------------------------------------------------------  [facility], [place]   Psychiatric Progress Note  Hospital Day #34     Interim History:   The patient's care was discussed with the treatment team and chart notes were reviewed.    Sleep: 5.5 hrs  Scheduled Medications: took oral haldol and amlodipine, continued to not take prozac    Staff Report: Isolative and withdrawn to room, observed pacing and responding to stimuli. Ate meals in room. Irritable. Generally more agreeable than on other shifts.    Patient Interview: Interviewed in room. Was welcoming to new attending, offering to shake hand. Reports that he is doing well: \"I watched the [employer] game, am reading rolling stone, blood pressure is good, I'm taking the BP medication; I wish I didn't have to take the haldol, though.\" Said that it's hard for him to see other patients on 72hour holds leave the hospital, while he is still here. When offered cogentin for shaking that he has (concerning for side effect of haldol), said that he has had this shaking for a long time, that it's not new. Not interested in taking medication for it.    The risks, benefits, alternatives and side effects of any medication changes have been discussed and are understood by the patient and other caregivers.    Review of systems:     ROS was negative unless noted above.          Allergies:     Allergies   Allergen Reactions     Mold      Itchy eyes, congestion     Nkda [No Known Drug Allergies]             Psychiatric Examination:   /89 (BP Location: Left arm)   Pulse 80   Temp 97.4  F (36.3  C) (Oral)   Resp 17   Ht 1.829 m (6')   Wt 142.7 kg (314 lb 8 oz)   SpO2 97%   BMI 42.65 kg/m    Weight is 0 lbs 0 oz  Body mass index is 42.86 kg/m .    Appearance:  Improved grooming, standing in room speaking with us  Attitude:  more cooperative, more cooperative and open than on previous " "days  Eye Contact:  Fair eye contact, similar to prior days  Mood:  \"fine\"  Affect:  mood congruent, intensity is blunted and constricted mobility; did not use mocking tone like earlier in admission  Speech:  clear, coherent, loud  Psychomotor Behavior:  no evidence of tardive dyskinesia, dystonia, or tics, did have shaking concerning for parkinsonian tremor  Thought Process:  linear, goal oriented and illogical  Associations:  no loose associations  Thought Content:  no evidence of suicidal ideation or homicidal ideation and patient appears to be responding to internal stimuli given self-talk in room  Insight:  poor  Judgment:  poor  Language: speaks fluent english         Labs:   No results found for this or any previous visit (from the past 24 hour(s)).       Assessment    Diagnostic Impression: Tex Garland is a 39 year old male with a past psychiatric history of schizoaffective disorder depressed type and seasonal affective disorder presented due to paranoia, disorganization, and agitation in the context of medication nonadherence. The patient has no known family history of mental illness. Current social factors include unemployment, lives alone, not having a car or phone, father with Parkinson's/Lewy Body dementia. Psychological factors include previous diagnoses of schizoaffective disorder depressed type and seasonal affective disorders. Biological factors include medication non-compliance. Likely diagnosis is schizophrenia given his marked disorganization and paranoia, and lack of obvious mood symptoms (other than irritability), although difficult to assess mood symptoms given patient's guardedness.     Hospital course: Tex Garland was admitted to station 22 NB on a 72 hour hold. Restarting aripiprazole or other antipsychotics was discussed with the patient, but he refused, as he attributed anorgasmia to abilify. Team recommended tapering off fluoxetine due to its adverse side " effect, but he refused--since he was declining antipsychotics and therapeutic benefit of fluxoetine is uncertain, it was discontinued . Due to the need for [id number] in the past and a history of altercations with strangers, and poor hygiene including rotting food in apartment, there is concern for harm to self or to others. Thus, petition for commitment and rodriguez was initiated.  On 6/28 a code was called for patient's worsening agitation (yelling at and swearing at peers, slamming doors, slamming plate in room, slamming toilet seat in room); he took oral olanzapine and ativan, and afterward was calm in his room. He required emergency forced medication (oral olanzapine and ativan) daily starting 6/29 for safety due to his psychotic symptoms. Prozac was re-initiated due to patient request, history of medicine being efficacious for mood. He at times denied wanting the prozac, but requested it continued to be offered in the AM. Rodriguez and commitment order received 7/9. Due to likely akathisia, limited response to zyprexa, and concern for metabolic side effects, zyprexa was discontinued for psychosis, and haldol initiated. Haldol increased from 5mg to 10mg on 7/15 due to continued psychosis. Significant improvement with 10mg haldol--able to be present in the milieu, having longer and less guarded conversations with staff&treatment team, and began to shower and engage in other ADLs.     Discontinued Medications (& Rationale):     Medical course: Tex Garland was brought in by EMS to [facility] ED on 6/19/2019. He was transferred to [facility] for psychiatric evaluation.    Plan   Principal Diagnosis:   # schizophrenia  vs schizoaffective disorder     Secondary psychiatric diagnoses of concern this admission: none     Psychotropic Medications:  Modify:  none     Continue:  - cont haldol to 10mg PO or IM (rodriguez medication)  - cont prozac 20mg daily (due to patient requesting continuing yesterday)  -  Acetaminophen 650 mg PRN for pain  - Albuterol inhaler PRN for wheezing, shortness of breath/dyspnea  - Hydroxyzine 25 mg PRN for anxiety  - Lorazepam 0.5 mg PRN for anxiety  - Olanzapine 10 mg PRN for agitation  - Trazodone 50 mg PRN for sleep     Patient will be treated in therapeutic milieu with appropriate individual and group therapies as described.        Medical diagnoses to be addressed this admission:    #HTN  -medicine consulted  -amlodipine 5mg daily  -UA and EKG unremarkable     Data: see above  Consults: internal medicine for HTN    Legal Status: Nicole  Committed    Safety Assessment:   Behavioral Orders   Procedures     Code 1 - Restrict to Unit     Elopement precautions     Routine Programming     As clinically indicated     Sexual precautions     Single Room     Status 15     Every 15 minutes.       Disposition: unknown pending medication management and clinical stabilization     Teresa Zhang MD   PGY-1    Attestation:  This patient has been seen and evaluated by me, Radha Jennings.  I have discussed this patient with the psychiatry resident and I agree with the findings and plan in this note.    I have reviewed today's vital signs, medications, labs and imaging.   Radha Jennings MD.

## 2019-07-23 NOTE — PROGRESS NOTES
"Pt presented with anxious affect, paranoid thought process, and isolative behavior. He was notably irritable in the morning reporting \"They (staff) told me I refused to go to court but that is bullshit,\" he asked writer and RN to provide documented proof that attempts had been made for him to engage in the court process. Pt was given corresponding dates and he requested documented proof, when told he must receive these from medical records he remarked \"This is all very suspicious\" and worte down names of staff and their direct quotes. Despite suspicion pt made efforts to speak respectfully and was more kind and personable than previously observed. He remained in his room throughout shift outside of receiving meal trays and making phone calls. He engaged in self-talk but denied presence of A/V hallucinations. No safety concerns were reported or observed.          07/23/19 1300   Behavioral Health   Hallucinations appears responding  (Pt engaging in self talk )   Thinking paranoid;poor concentration   Orientation person: oriented;place: oriented;date, disoriented;time, disoriented   Memory new learning, recall loss  (Pt does not recall being asked to go to court on previously)   Insight poor   Judgement impaired   Eye Contact at examiner   Affect tense;irritable   Mood anxious   Physical Appearance/Attire disheveled;untidy   Hygiene neglected grooming - unclean body, hair, teeth   1. Wish to be Dead No   Wish to be Dead Description Pt denies   2. Non-Specific Active Suicidal Thoughts  No   Non-Specific Active Suicidal Thought Description Pt denies   Psycho Education   Type of Intervention 1:1 intervention   Response participates with encouragement   Hours 0.5   Treatment Detail Check-in   Activities of Daily Living   Hygiene/Grooming prompts   Oral Hygiene prompts   Dress independent;scrubs (behavioral health)   Laundry unable to complete   Room Organization prompts     "

## 2019-07-23 NOTE — PROGRESS NOTES
Pt was isolative and socially withdrawn throughout most of the shift. Pt was observed reading/writing in room, eating snack/dinner, and talking on the phone. While in room pt was frequently observed pacing around and talking to himself, sometimes using vulgar language. Pt presented with a tense affect and appeared paranoid and anxious. However, pt was generally more agreeable than in previous shifts. Pt did not report experiencing any mental health symptoms including SI, Hi, or SIB and no clear indication of these symptoms was observed.        07/22/19 1813   Behavioral Health   Hallucinations   (unsubstantiated, pt observed talking to himself)   Thinking distractable;paranoid;poor concentration   Orientation person: oriented;place: oriented   Memory   (unsubstantiated)   Insight poor   Judgement impaired   Eye Contact out of corner of eyes;at examiner   Affect tense   Mood anxious   Physical Appearance/Attire untidy   Hygiene neglected grooming - unclean body, hair, teeth   Suicidality   (pt did not report thoughts or urges)   Self Injury   (pt did not report thoughts or urges)   Elopement   (no apparent risk\)   Activity isolative;withdrawn   Speech pressured;clear;coherent   Medication Sensitivity no stated side effects;no observed side effects   Psychomotor / Gait fast;balanced;steady   Psycho Education   Type of Intervention 1:1 intervention   Response refuses   Hours 0.5   Treatment Detail   (pt refused staff check-in)   Activities of Daily Living   Hygiene/Grooming independent;prompts   Oral Hygiene independent   Dress street clothes;scrubs (behavioral health);independent   Laundry   (pt did not do laundry)   Room Organization independent   Activity   Activity Assistance Provided independent

## 2019-07-23 NOTE — PLAN OF CARE
Tex has been isolative to room. Out for dinner tray. Then returned to room. He has not had any questions yet this evening. Some paranoia earlier in the day when he questioned that he had not been involved in the court process.

## 2019-07-24 PROCEDURE — 12400001 ZZH R&B MH UMMC

## 2019-07-24 PROCEDURE — 99232 SBSQ HOSP IP/OBS MODERATE 35: CPT | Mod: GC | Performed by: PSYCHIATRY & NEUROLOGY

## 2019-07-24 PROCEDURE — 25000132 ZZH RX MED GY IP 250 OP 250 PS 637: Mod: GY | Performed by: STUDENT IN AN ORGANIZED HEALTH CARE EDUCATION/TRAINING PROGRAM

## 2019-07-24 PROCEDURE — 25000132 ZZH RX MED GY IP 250 OP 250 PS 637: Mod: GY | Performed by: PHYSICIAN ASSISTANT

## 2019-07-24 RX ADMIN — AMLODIPINE BESYLATE 5 MG: 2.5 TABLET ORAL at 09:27

## 2019-07-24 RX ADMIN — HALOPERIDOL 10 MG: 5 TABLET ORAL at 21:03

## 2019-07-24 ASSESSMENT — ACTIVITIES OF DAILY LIVING (ADL)
DRESS: INDEPENDENT
HYGIENE/GROOMING: INDEPENDENT
ORAL_HYGIENE: INDEPENDENT

## 2019-07-24 NOTE — PLAN OF CARE
Tex has been out of his room more. He comes to get his tray, out to shower, use phone, get snack, etc. Poor eye contact, looking in a different direction toward door of room. He said that he has not enjoyed much of the TV programming; so that doesn't interest him. He would prefer to watch baseball. This nurse encouraged him to attend community meeting so that he can make his wishes known re: TV, etc. He admits to some frustration that he has been hospitalized for 5 weeks during the summer. Says that he looks forward to going to a [place] after discharge with access to the creek, nature trails, etc. Denies any symptoms of MI. Says that he does not like taking haldol, but at the same time acknowledges that he has been doing better self care.

## 2019-07-24 NOTE — PLAN OF CARE
BEHAVIORAL TEAM DISCUSSION    Participants:   Dr. Lugo, Attending  Dipti Cobb, LPCC, LADC, CTC  Damari Mcelroy MD, PGY-1  Mikie Orozco, MS3  Peyton Dodd, MS3  Carolina Sheffield RN  Progress: Patient made efforts to speak respectfully and was more kind and personable than previously observed. Staff note patient is less isolative, patient reports he has showered.   Continued Stay Criteria/Rationale: Patient continues to display MH symptoms, paranoia. PD was revoked.  Medical/Physical: No active issues - refer to H&P.  Precautions:   Behavioral Orders   Procedures    Code 1 - Restrict to Unit    Elopement precautions    Routine Programming     As clinically indicated    Sexual precautions    Single Room    Status 15     Every 15 minutes.   Plan: The plan is to assess and patient for mental health and medication needs.  Medications are still being adjusted as patient continues to respond well.  Rationale for change in precautions or plan: No change

## 2019-07-24 NOTE — PROGRESS NOTES
"    ----------------------------------------------------------------------------------------------------------  [facility], [place]   Psychiatric Progress Note  Hospital Day #35     Interim History:   The patient's care was discussed with the treatment team and chart notes were reviewed.    Sleep: 6.5 hours  Scheduled Medications: Took all as prescribed except fluoxetine (declined).  PRN Medications: None.    Staff Report: No acute events. Per staff report, Tex was anxious and paranoid regarding the commitment process and stated \"this is all very suspicious,\" while writing down staff names and what they were saying. He continued to deny symptoms of mental illness. He expressed frustration that he has been hospitalized during the summer and talked about what he would rather be doing. He also stated that he is not enjoying the TV programming, at which point he was encouraged to participated in community meeting.     Patient Interview: Patient was interviewed in the milieu on station 22. He states that he is \"doing the same\" as he was earlier when the team mentioned that he seemed significantly improved. He listed all of the activities he participates in on the unit and stated \"I've never had a problem handling interactions\". When asked about the observed tremor in his hands, he states that he has had it for 15 years and that it does not bother him. He took medication in the past but did not feel it was helpful.    Conversation with patient's mother Kirk Garland: Mother reports that he is 100 times better than when he was admitted and is \"pretty close\" to his baseline. She is concerned about his lack of insight and the state of his apartment, which she describes as \"unlivable\". She reports that when he is stable he will express some paranoid thoughts, which she is aware he is doing now. She also expressed concern regarding what will happen to Tex as she and her family age, as they " "have been supporting him in maintaining some degree of independence. She thinks that it would ideal for Tex to attend a program following discharge that would provide him with some structure. She also notes this his longtime  from the past, who was temporarily not working with Tex around the time preceding his hospitalization, is able to work with him now. Finally, she expressed hope that he would be able to obtain therapy for social anxiety, which he has struggled with throughout his life.     The risks, benefits, alternatives and side effects of any medication changes have been discussed and are understood by the patient and other caregivers.    Review of systems:     ROS was negative unless noted above.     Allergies:     Allergies   Allergen Reactions     Mold      Itchy eyes, congestion     Nkda [No Known Drug Allergies]        Psychiatric Examination:   BP (!) 132/91 (BP Location: Left arm)   Pulse 80   Temp 97.8  F (36.6  C) (Oral)   Resp 17   Ht 1.829 m (6')   Wt 142.7 kg (314 lb 8 oz)   SpO2 97%   BMI 42.65 kg/m    Weight is 314 lbs 8 oz  Body mass index is 42.65 kg/m .    Appearance:  Awake and alert.  Attitude:  cooperative, guarded  Eye Contact:  poor , spends most of interview staring at the ground  Mood:  \"I'm doing the same\"  Affect:  intensity is blunted and constricted mobility  Speech:  clear, coherent  Psychomotor Behavior:  no evidence of tardive dyskinesia, dystonia, or tics. Visible tremor in both hands.   Thought Process:  linear  Associations:  no loose associations  Thought Content:  Does not appear to be responding to internal stimuli during conversation.   Insight:  limited  Judgment:  limited  Oriented to:  Unable to assess.  Attention Span and Concentration:  Adequate for conversation.  Recent and Remote Memory:  Unable to assess.  Language: Speaks English fluently with appropriate vocabulary and syntax  Fund of Knowledge: Unable to assess  Muscle Strength and Tone: " Tremor in both hands, otherwise appear grossly normal.   Gait and Station: Normal     Labs:   No results found for this or any previous visit (from the past 24 hour(s)).     Assessment    Diagnostic Impression: Tex Garland is a 39 year old male with a past psychiatric history of schizoaffective disorder depressed type and seasonal affective disorder presented due to paranoia, disorganization, and agitation in the context of medication nonadherence. The patient has no known family history of mental illness. Current social factors include unemployment, living alone, not having phone, and father with Parkinson's/Lewy Body dementia. Psychological factors include previous diagnoses of schizoaffective disorder depressed type and seasonal affective disorders. Biological factors include medication non-compliance. Provisional diagnosis of schizophrenia vs schizoaffective disorder depressed type, although his guardedness does make it difficult to assess mood.    Hospital course: Tex Garland was admitted to station 22 NB on a 72 hour hold. Restarting aripiprazole or other antipsychotics was discussed with the patient, but he refused, as he attributed anorgasmia due to Abilify. Team recommended tapering off fluoxetine due to its adverse side effect, but he declined. Since he was declining antipsychotics and therapeutic benefit of fluxoetine is uncertain, it was discontinued . Due to the need for police de-esclation in the past and a history of altercations with strangers, as well as poor hygiene including rotting food in apartment, there was concern for harm to self or to others. Thus, petition for commitment and Nicole was initiated.  On 6/28 a code was called for patient's worsening agitation (yelling at and swearing at peers, slamming doors, slamming plate in room, slamming toilet seat in room); he took oral olanzapine and Ativan, and afterward was calm in his room. He required emergency forced  medication (oral olanzapine and Ativan) daily starting 6/29 for safety due to his psychotic symptoms. Prozac was re-initiated due to patient request and history of medicine being efficacious for mood. He at times denied wanting the Prozac, but requested it continued to be offered in the AM. Nicole and commitment order received 7/9. Due to likely akathisia, limited response to zyprexa, and concern for metabolic side effects, zyprexa was discontinued for psychosis, and haldol initiated. Haldol increased from 5mg to 10mg on 7/15 due to continued psychosis. Significant improvement was seen with with 10mg haldol. He was able to be present in the milieu, having longer and less guarded conversations with staff and treatment team, and began to shower and engage in other ADLs.     Discontinued Medications (& Rationale):   - Abilify: patient believes this causes anorgasmia.  - Zyprexa: akathisia, metabolic side effects    Medical course: Patient was medically cleared for admission to the inpatient psychiatry unit.     Plan   Principal Diagnosis:   # Schizophrenia vs schizoaffective disorder, depressive type  - Continue haloperidol 10 mg PO or IM.  - Continue fluoxetine 20 mg daily, per patient request  - Re-assess depressive symptoms in future.  - Consider treatment with [facility].  - Haldol 5 mg IM and diphenhydramine 50 mg IM and 2 mg lorazepam IM PRN Q6H for agitation  - Hydroxyzine 25 mg Q4H PRN for anxiety  - Lorazepam 1 mg PO BID PRN for anxiety  - Trazodone 50 mg PO at bedtime PRN for insomnia, pay repeat x1    Patient will be treated in therapeutic milieu with appropriate individual and group therapies as described.    Medical diagnoses to be addressed this admission:      # Hypertension  - Continue amlodipine 5 mg daily.  - Continue to monitor blood pressure daily.   - Medicine service monitoring peripherally.    # Asthma  - Continue PTA albuterol PRN.    Legal Status: Committed with [facility]    Safety Assessment:    Behavioral Orders   Procedures     Code 1 - Restrict to Unit     Elopement precautions     Routine Programming     As clinically indicated     Sexual precautions     Single Room     Status 15     Every 15 minutes.       Disposition: Pending clinical stabilization and medication management.     Patient seen & discussed with attending psychiatrist Parish Matos MD, PhD.  Damari Mcelroy MD  Psychiatry PGY-1    Attestation:  This patient has been seen and evaluated by me, Shawna Lugo.  I have discussed this patient with the house staff team including the resident and medical student and I agree with the findings and plan in this note.    I have reviewed today's vital signs, medications, labs and imaging. Shawna Lugo M.D., Ph.D.

## 2019-07-24 NOTE — PROGRESS NOTES
Patient continues to spend much of the shift in his room.  Patient is very brief but polite when staff approaches patient.  Patient comes out to get his meal tray to bring to his room.  Patient was also out to use the telephone.  Patient denies any issues but appears to be responding to hallucinations while in room.  Patient occationally talking while alone in room.     07/24/19 1400   Behavioral Health   Hallucinations appears responding   Thinking distractable;poor concentration   Orientation person: oriented;place: oriented   Memory baseline memory   Insight poor   Judgement impaired   Affect tense   Mood anxious   Physical Appearance/Attire attire appropriate to age and situation   Hygiene other (see comment)  (adequate grooming)   Suicidality other (see comments)  (denies)   1. Wish to be Dead No   2. Non-Specific Active Suicidal Thoughts  No   Self Injury other (see comment)  (denies)   Activity withdrawn;isolative   Speech pressured;coherent   Medication Sensitivity no stated side effects   Psychomotor / Gait balanced;fast   Psycho Education   Type of Intervention 1:1 intervention   Response participates with encouragement   Hours 0.5   Treatment Detail check-in

## 2019-07-25 PROCEDURE — 12400001 ZZH R&B MH UMMC

## 2019-07-25 PROCEDURE — 25000132 ZZH RX MED GY IP 250 OP 250 PS 637: Mod: GY | Performed by: STUDENT IN AN ORGANIZED HEALTH CARE EDUCATION/TRAINING PROGRAM

## 2019-07-25 PROCEDURE — 25000132 ZZH RX MED GY IP 250 OP 250 PS 637: Mod: GY | Performed by: PHYSICIAN ASSISTANT

## 2019-07-25 PROCEDURE — 99231 SBSQ HOSP IP/OBS SF/LOW 25: CPT | Mod: GC | Performed by: PSYCHIATRY & NEUROLOGY

## 2019-07-25 RX ADMIN — AMLODIPINE BESYLATE 5 MG: 2.5 TABLET ORAL at 09:57

## 2019-07-25 RX ADMIN — HALOPERIDOL 10 MG: 5 TABLET ORAL at 20:32

## 2019-07-25 ASSESSMENT — ACTIVITIES OF DAILY LIVING (ADL)
HYGIENE/GROOMING: HANDWASHING;SHOWER;INDEPENDENT
DRESS: STREET CLOTHES;INDEPENDENT
LAUNDRY: WITH SUPERVISION
ORAL_HYGIENE: INDEPENDENT
DRESS: INDEPENDENT
LAUNDRY: WITH SUPERVISION
ORAL_HYGIENE: INDEPENDENT
HYGIENE/GROOMING: INDEPENDENT

## 2019-07-25 NOTE — PROGRESS NOTES
"    ----------------------------------------------------------------------------------------------------------  [facility], [place]   Psychiatric Progress Note  Hospital Day #36     Interim History:   The patient's care was discussed with the treatment team and chart notes were reviewed.    Sleep: 4.75 hrs  Scheduled Medications: Took all as prescribed except fluoxetine (declined).  PRN Medications: None.    Staff Report: No acute events. Per staff report, Tex continues to spend a significant amount of time in his room but is polite when interacting with staff. He did spend time in the milieu to watch part of the [employer] game. He appeared to be responding to internal stimuli when he was in his room and was occasionally heard talking alone in his room. Staff observed a conversation between Tex and a peer, during which he appeared anxious and expressed that he hoped he would not need to be involved in aftercare following discharge and that he believed he would be able to discontinue his medications relatively soon. Per staff, Tex appears to be speaking to himself with \"much less frequency and intensity than usual\".     Patient Interview: Patient was interviewed in the milieu on station 22. We discussed the [employer] game from last night and his visit with his mother. He reports that they played cards together. Tex was agreeable to us contacting his mother for a family meeting and was hopeful that this could be done tomorrow (Friday).     The risks, benefits, alternatives and side effects of any medication changes have been discussed and are understood by the patient and other caregivers.    Review of systems:     ROS was negative unless noted above.     Allergies:     Allergies   Allergen Reactions     Mold      Itchy eyes, congestion     Nkda [No Known Drug Allergies]        Psychiatric Examination:   BP (!) 132/91 (BP Location: Left arm)   Pulse 80   Temp 97.8  F (36.6  C) (Oral)   Resp " "17   Ht 1.829 m (6')   Wt 142.7 kg (314 lb 8 oz)   SpO2 97%   BMI 42.65 kg/m    Weight is 314 lbs 8 oz  Body mass index is 42.65 kg/m .    Appearance:  Awake, alert, wearing own clothing, adequately groomed.  Attitude:  Cooperative, very polite  Eye Contact:  Poor, staring at floor throughout interview.  Mood:  Either \"fine\" or \"okay\"  Affect:  mood congruent and intensity is blunted  Speech:  Clear and coherent  Psychomotor Behavior:  no evidence of tardive dyskinesia, dystonia, or tics  Thought Process:  linear  Associations:  no loose associations  Thought Content:  Did not appear to be responding to internal stimuli.  Insight:  limited  Judgment:  limited  Oriented to:  time, person, and place  Attention Span and Concentration:  Adequate for conversation.  Recent and Remote Memory:  intact - remembers events from previous day  Language: Speaks English with appropriate vocabulary and syntax.  Fund of Knowledge: Unable to assess  Muscle Strength and Tone: appears grossly normal except for bilateral tremor in hands  Gait and Station: Normal.       Labs:   No results found for this or any previous visit (from the past 24 hour(s)).     Assessment    Diagnostic Impression: Tex Garland is a 39 year old male with a past psychiatric history of schizoaffective disorder depressed type and seasonal affective disorder presented due to paranoia, disorganization, and agitation in the context of medication nonadherence. The patient has no known family history of mental illness. Current social factors include unemployment, living alone, not having phone, and father with Parkinson's/Lewy Body dementia. Psychological factors include previous diagnoses of schizoaffective disorder depressed type and seasonal affective disorders. Biological factors include medication non-compliance. Provisional diagnosis of schizophrenia vs schizoaffective disorder depressed type, although his guardedness does make it difficult to " assess mood.    Hospital course: Tex Garland was admitted to station 22 NB on a 72 hour hold. Restarting aripiprazole or other antipsychotics was discussed with the patient, but he refused, as he attributed anorgasmia due to Abilify. Team recommended tapering off fluoxetine due to its adverse side effect, but he declined. Since he was declining antipsychotics and therapeutic benefit of fluxoetine is uncertain, it was discontinued . Due to the need for police de-esclation in the past and a history of altercations with strangers, as well as poor hygiene including rotting food in apartment, there was concern for harm to self or to others. Thus, petition for commitment and Nicole was initiated.  On 6/28 a code was called for patient's worsening agitation (yelling at and swearing at peers, slamming doors, slamming plate in room, slamming toilet seat in room); he took oral olanzapine and Ativan, and afterward was calm in his room. He required emergency forced medication (oral olanzapine and Ativan) daily starting 6/29 for safety due to his psychotic symptoms. Prozac was re-initiated due to patient request and history of medicine being efficacious for mood. He at times denied wanting the Prozac, but requested it continued to be offered in the AM. Nicole and commitment order received 7/9. Due to likely akathisia, limited response to zyprexa, and concern for metabolic side effects, zyprexa was discontinued for psychosis, and haldol initiated. Haldol increased from 5mg to 10mg on 7/15 due to continued psychosis. Significant improvement was seen with with 10mg haldol. He was able to be present in the milieu, having longer and less guarded conversations with staff and treatment team, and began to shower and engage in other ADLs.     Discontinued Medications (& Rationale):   - Abilify: patient believes this causes anorgasmia.  - Zyprexa: akathisia, metabolic side effects    Medical course: Patient was medically  cleared for admission to the inpatient psychiatry unit.     Plan   Principal Diagnosis:   # Schizophrenia vs schizoaffective disorder, depressive type  - Continue haloperidol 10 mg PO or IM.  - Continue fluoxetine 20 mg daily, per patient request  - Re-assess depressive symptoms in future.  - Consider treatment with [facility].  - Haldol 5 mg IM and diphenhydramine 50 mg IM and 2 mg lorazepam IM PRN Q6H for agitation  - Hydroxyzine 25 mg Q4H PRN for anxiety  - Lorazepam 1 mg PO BID PRN for anxiety  - Trazodone 50 mg PO at bedtime PRN for insomnia, pay repeat x1.  - Plan for family meeting early next week, to include outpatient .    Patient will be treated in therapeutic milieu with appropriate individual and group therapies as described.    Medical diagnoses to be addressed this admission:      # Hypertension  - Continue amlodipine 5 mg daily.  - Continue to monitor blood pressure daily.   - Medicine service monitoring peripherally.    # Asthma  - Continue PTA albuterol PRN.    Legal Status: Committed with Nicole    Safety Assessment:   Behavioral Orders   Procedures     Code 1 - Restrict to Unit     Elopement precautions     Routine Programming     As clinically indicated     Sexual precautions     Single Room     Status 15     Every 15 minutes.       Disposition: Pending clinical stabilization and medication management. Discharge plan to include day treatment or IRTS.    Patient seen & discussed with attending psychiatrist, Radha Jennings MD.   Damari Mcelroy MD  Psychiatry PGY-1    Attestation:  This patient has been seen and evaluated by me, Radha Jennings.  I have discussed this patient with the psychiatry resident and I agree with the findings and plan in this note.    I have reviewed today's vital signs, medications, labs and imaging.   Radha Jennings MD.

## 2019-07-25 NOTE — PROGRESS NOTES
Pt spent more time in milieu this shift watching the [employer] game (about an hour total, retreating to room frequently). Pt observed having conversation with peer which was rational and organized. Appears anxious. Pt observed talking to self in room, but with much less frequency and intensity than usual. Pt mentioned to peer that the most important thing to him is to not have to have aftercare, and that he thinks he can get off his medications pretty soon.      07/24/19 2200   Behavioral Health   Hallucinations appears responding   Thinking distractable;delusional   Orientation person: oriented;place: oriented;date: oriented;time: oriented   Memory baseline memory   Insight poor   Judgement impaired   Eye Contact at examiner   Affect tense   Mood anxious   Physical Appearance/Attire attire appropriate to age and situation   Hygiene neglected grooming - unclean body, hair, teeth   Suicidality other (see comments)  (denies )   1. Wish to be Dead No   2. Non-Specific Active Suicidal Thoughts  No   Self Injury other (see comment)  (denies )   Activity withdrawn;other (see comment)  (in milieu much more than usual )   Speech pressured;clear;coherent   Medication Sensitivity no observed side effects   Psychomotor / Gait balanced;steady   Psycho Education   Type of Intervention 1:1 intervention   Response participates, initiates socially appropriate   Hours 0.5   Treatment Detail check in    Activities of Daily Living   Hygiene/Grooming independent   Oral Hygiene independent   Dress independent   Room Organization independent   Activity   Activity Assistance Provided independent

## 2019-07-26 PROCEDURE — 25000132 ZZH RX MED GY IP 250 OP 250 PS 637: Mod: GY | Performed by: STUDENT IN AN ORGANIZED HEALTH CARE EDUCATION/TRAINING PROGRAM

## 2019-07-26 PROCEDURE — 12400001 ZZH R&B MH UMMC

## 2019-07-26 PROCEDURE — 99232 SBSQ HOSP IP/OBS MODERATE 35: CPT | Mod: GC | Performed by: PSYCHIATRY & NEUROLOGY

## 2019-07-26 PROCEDURE — 25000132 ZZH RX MED GY IP 250 OP 250 PS 637: Mod: GY | Performed by: PHYSICIAN ASSISTANT

## 2019-07-26 RX ADMIN — AMLODIPINE BESYLATE 5 MG: 2.5 TABLET ORAL at 08:16

## 2019-07-26 RX ADMIN — HALOPERIDOL 10 MG: 5 TABLET ORAL at 21:22

## 2019-07-26 ASSESSMENT — ACTIVITIES OF DAILY LIVING (ADL)
DRESS: STREET CLOTHES
HYGIENE/GROOMING: HANDWASHING;SHOWER;INDEPENDENT
ORAL_HYGIENE: INDEPENDENT
LAUNDRY: WITH SUPERVISION

## 2019-07-26 NOTE — PROGRESS NOTES
Patient continues to spend most of the shift in his room.  Patient was out for meals but ate in room.  Patient was also out to take a shower and request a razor.  Patient is pleasant and polite on approach but has a tense demeanor.  Patient had a visit from his mother and visit appeared to go well.  Patient was seen and heard on numerous occasions talking and yelling while by himself in his room.  Patient denies SI/SIB.     07/26/19 1400   Behavioral Health   Hallucinations appears responding   Thinking distractable   Orientation person: oriented;place: oriented   Memory baseline memory   Insight poor   Judgement impaired   Eye Contact at examiner   Affect blunted, flat;tense   Mood anxious   Physical Appearance/Attire attire appropriate to age and situation   Hygiene well groomed   Suicidality other (see comments)  (denies)   1. Wish to be Dead No   2. Non-Specific Active Suicidal Thoughts  No   Self Injury other (see comment)  (denies)   Activity isolative;withdrawn   Speech coherent   Medication Sensitivity no stated side effects   Psychomotor / Gait balanced;steady   Psycho Education   Type of Intervention 1:1 intervention   Response participates with encouragement   Hours 0.5   Treatment Detail check-in

## 2019-07-26 NOTE — PROGRESS NOTES
"    ----------------------------------------------------------------------------------------------------------  [facility], [place]   Psychiatric Progress Note  Hospital Day #37     Interim History:   The patient's care was discussed with the treatment team and chart notes were reviewed.    Sleep: 7 hrs  Scheduled Medications: Took all as prescribed except fluoxetine (declined).  PRN Medications: None.    Staff Report: No acute events. Per staff report, Tex continues to spend time in his room reading and writing and is occasionally heard talking to himself. His mom called the unit yesterday to express concern regarding recent changes that she has witnessed in Tex, including loud, rapid speech, sudden mood changes, and intermittently standing up throughout card game. She would like us to emphasize community resources during our upcoming family meeting. Noted to be talking to himself in the evening but denied hallucinations. Reported feeling positive overall but upset that he has to stay in the hospital longer than anticipated.    Patient Interview: Patient was interviewed in his room on station 22. He stated that he was feeling \"good\" and \"great\". He mentioned that he was disappointed the family meeting could not occur today but understands that it is because his  could not be present. He states that he has a plan following discharge that includes case management, treatment from a psychologist, and family support. He stated that he does not need any \"drug treatment\".  He also referred to [facility] in [place] and [facility] as places that he would visit after discharge. He states that the [facility] in [place] does not have have lot going on and that he is not sure he needs it but he would prefer not to attend aftercare, which he did nine years ago following his hospitalization at [facility] [place], [place], because it would interfere with his scheduled. He also listed going on " "walks as a way to manage stress.    The risks, benefits, alternatives and side effects of any medication changes have been discussed and are understood by the patient and other caregivers.    Review of systems:     ROS was negative unless noted above.     Allergies:     Allergies   Allergen Reactions     Mold      Itchy eyes, congestion     Nkda [No Known Drug Allergies]        Psychiatric Examination:   /86 (BP Location: Left arm)   Pulse 94   Temp 98.3  F (36.8  C) (Tympanic)   Resp 18   Ht 1.829 m (6')   Wt 142.7 kg (314 lb 8 oz)   SpO2 96%   BMI 42.65 kg/m    Weight is 314 lbs 8 oz  Body mass index is 42.65 kg/m .    Appearance:  Awake, alert, dressed in own clothing.  Attitude:  cooperative  Eye Contact:  poor , occasionally looked at interviewer but most of the time his gaze was directed at the ground.  Mood:  \"good\"  Affect:  appropriate and in normal range and mood congruent  Speech:  Rapid but interruptable and coherent.  Psychomotor Behavior:  no evidence of tardive dyskinesia, dystonia, or tics  Thought Process:  linear  Associations:  no loose associations  Thought Content:  Does not appear to be responding to internal stimuli.  Insight:  limited  Judgment:  limited  Oriented to:  time, person, and place  Attention Span and Concentration:  Adequate for conversation.  Recent and Remote Memory:  intact  Language: Speaks English with appropriate syntax and vocabulary.  Fund of Knowledge: Unable to assess  Muscle Strength and Tone: Appears grossly normal except for bilateral tremor in upper extremities.   Gait and Station: Normal       Labs:   No results found for this or any previous visit (from the past 24 hour(s)).     Assessment    Diagnostic Impression: Tex Garland is a 39 year old male with a past psychiatric history of schizoaffective disorder depressed type and seasonal affective disorder presented due to paranoia, disorganization, and agitation in the context of medication " nonadherence. The patient has no known family history of mental illness. Current social factors include unemployment, living alone, not having phone, and father with Parkinson's/Lewy Body dementia. Psychological factors include previous diagnoses of schizoaffective disorder depressed type and seasonal affective disorders. Biological factors include medication non-compliance. Provisional diagnosis of schizophrenia vs schizoaffective disorder depressed type, although his guardedness does make it difficult to assess mood.    Hospital course: Tex Garland was admitted to station 22 NB on a 72 hour hold. Restarting aripiprazole or other antipsychotics was discussed with the patient, but he refused, as he attributed anorgasmia due to Abilify. Team recommended tapering off fluoxetine due to its adverse side effect, but he declined. Since he was declining antipsychotics and therapeutic benefit of fluxoetine is uncertain, it was discontinued . Due to the need for police de-esclation in the past and a history of altercations with strangers, as well as poor hygiene including rotting food in apartment, there was concern for harm to self or to others. Thus, petition for commitment and Nicole was initiated.  On 6/28 a code was called for patient's worsening agitation (yelling at and swearing at peers, slamming doors, slamming plate in room, slamming toilet seat in room); he took oral olanzapine and Ativan, and afterward was calm in his room. He required emergency forced medication (oral olanzapine and Ativan) daily starting 6/29 for safety due to his psychotic symptoms. Prozac was re-initiated due to patient request and history of medicine being efficacious for mood. He at times denied wanting the Prozac, but requested it continued to be offered in the AM. Nicole and commitment order received 7/9. Due to likely akathisia, limited response to zyprexa, and concern for metabolic side effects, zyprexa was discontinued for  psychosis, and haldol initiated. Haldol increased from 5mg to 10mg on 7/15 due to continued psychosis. Significant improvement was seen with with 10mg haldol. He was able to be present in the milieu, having longer and less guarded conversations with staff and treatment team, and began to shower and engage in other ADLs.     Discontinued Medications (& Rationale):   - Abilify: patient believes this causes anorgasmia.  - Zyprexa: akathisia, metabolic side effects    Medical course: Patient was medically cleared for admission to the inpatient psychiatry unit.     Plan   Principal Diagnosis:   # Schizophrenia vs schizoaffective disorder, depressive type  - Continue haloperidol 10 mg PO or IM.  - Continue fluoxetine 20 mg daily, per patient request  - Re-assess depressive symptoms in future.  - Consider treatment with [facility].  - Haldol 5 mg IM and diphenhydramine 50 mg IM and 2 mg lorazepam IM PRN Q6H for agitation  - Hydroxyzine 25 mg Q4H PRN for anxiety  - Lorazepam 1 mg PO BID PRN for anxiety  - Trazodone 50 mg PO at bedtime PRN for insomnia, pay repeat x1.  - Plan for family meeting early next week, to include outpatient .  - Consider ACT team.   - Consider day treatment and/or [facility].    Patient will be treated in therapeutic milieu with appropriate individual and group therapies as described.    Medical diagnoses to be addressed this admission:      # Hypertension: Blood pressure is within normal range today (7/26).  - Continue amlodipine 5 mg daily.  - Continue to monitor blood pressure daily.   - Medicine service monitoring peripherally.    # Asthma  - Continue PTA albuterol PRN.    Legal Status: Committed with Nicole    Safety Assessment:   Behavioral Orders   Procedures     Code 1 - Restrict to Unit     Elopement precautions     Routine Programming     As clinically indicated     Sexual precautions     Single Room     Status 15     Every 15 minutes.       Disposition: Pending clinical stabilization  and medication management, possibly discharge early next week.    Patient seen & discussed with attending psychiatrist, Shawna Lugo MD, PhD.  Damari Mcelroy MD  Psychiatry PGY-1    Attestation:  This patient has been seen and evaluated by me, Shawna Lugo.  I have discussed this patient with the house staff team including the resident and medical student and I agree with the findings and plan in this note.    I have reviewed today's vital signs, medications, labs and imaging. Shawna Lugo M.D., Ph.D.

## 2019-07-26 NOTE — PROGRESS NOTES
Pt was isolated in his room the majority of the evening. Pt denies hallucinations but appeared talking to himself in his room. Pt reported feeling positive this evening but was upset that he heard that he has to stay longer than he thought. Pt denies SI and SIB.          07/25/19 2057   Behavioral Health   Hallucinations appears responding   Thinking distractable   Orientation person: oriented;place: oriented   Memory baseline memory   Insight poor   Judgement impaired   Eye Contact at examiner   Affect tense   Mood anxious   Physical Appearance/Attire attire appropriate to age and situation   Hygiene neglected grooming - unclean body, hair, teeth   Suicidality other (see comments)  (Denies)   1. Wish to be Dead No   2. Non-Specific Active Suicidal Thoughts  No   3. Active Sucidal Ideation with any Methods (Not Plan) Without Intent to Act  No   4. Active Suicidal Ideation with Some Intent to Act, Without Specific Plan  No   5. Active Suicidal Ideation with Specific Plan and Intent  No   Self Injury other (see comment)  (None Observed)   Activity withdrawn;isolative   Speech coherent;clear   Medication Sensitivity no stated side effects;no observed side effects   Psychomotor / Gait balanced;steady   Overt Aggression Scale   Verbal Aggression 0   Aggression against Property 0   Auto-Aggression 0   Physical Aggression 0   Overt Aggression Total Score 0   Activities of Daily Living   Hygiene/Grooming handwashing;shower;independent   Oral Hygiene independent   Dress street clothes;independent   Laundry with supervision   Room Organization independent

## 2019-07-27 PROCEDURE — 12400001 ZZH R&B MH UMMC

## 2019-07-27 PROCEDURE — 25000132 ZZH RX MED GY IP 250 OP 250 PS 637: Mod: GY | Performed by: PHYSICIAN ASSISTANT

## 2019-07-27 PROCEDURE — 25000132 ZZH RX MED GY IP 250 OP 250 PS 637: Mod: GY | Performed by: STUDENT IN AN ORGANIZED HEALTH CARE EDUCATION/TRAINING PROGRAM

## 2019-07-27 RX ADMIN — AMLODIPINE BESYLATE 5 MG: 2.5 TABLET ORAL at 11:32

## 2019-07-27 RX ADMIN — HALOPERIDOL 10 MG: 5 TABLET ORAL at 21:10

## 2019-07-27 ASSESSMENT — ACTIVITIES OF DAILY LIVING (ADL)
DRESS: INDEPENDENT
LAUNDRY: WITH SUPERVISION
DRESS: INDEPENDENT
ORAL_HYGIENE: INDEPENDENT
ORAL_HYGIENE: INDEPENDENT
LAUNDRY: WITH SUPERVISION
HYGIENE/GROOMING: INDEPENDENT
HYGIENE/GROOMING: INDEPENDENT

## 2019-07-27 NOTE — PROGRESS NOTES
Pt is isolative to his room, out only for meals and eats in his room. Not social. Continues to presents tense, anxious and paranoid. Brief pleasant pressured requests. Declined groups. No shower.     07/27/19 1300   Behavioral Health   Hallucinations appears responding   Thinking distractable   Orientation person: oriented;place: oriented   Memory baseline memory   Insight poor   Judgement impaired   Eye Contact at examiner   Affect blunted, flat;tense   Mood anxious   Physical Appearance/Attire attire appropriate to age and situation   Hygiene other (see comment)  (adequate)   1. Wish to be Dead No   2. Non-Specific Active Suicidal Thoughts  No   Activity isolative;withdrawn   Speech pressured;clear;coherent   Psychomotor / Gait balanced;steady   Psycho Education   Type of Intervention structured groups   Response refuses   Activities of Daily Living   Hygiene/Grooming independent   Oral Hygiene independent   Dress independent   Laundry with supervision   Room Organization independent

## 2019-07-27 NOTE — PLAN OF CARE
Tex agreed to talk 1:1 this evening. He continues to look toward the door, not giving eye contact to this nurse. He said that he is pleased that his BP has gone down after taking the BP med. He looks forward to discharge soon. Says he hopes that after a family meeting on Monday, he might be discharged. He looks forward to being outdoors in summer after being hospitalized for 5 weeks. Denies any hallucinations, SI, SIB.

## 2019-07-27 NOTE — PROGRESS NOTES
Pt. Slept 5.5 hours over night. Pt spend time reading in bed. Pt was isolative to room all night, no responding as on previous nights. Will continue to monitor and assess.

## 2019-07-28 PROCEDURE — 12400001 ZZH R&B MH UMMC

## 2019-07-28 PROCEDURE — 25000132 ZZH RX MED GY IP 250 OP 250 PS 637: Mod: GY | Performed by: PHYSICIAN ASSISTANT

## 2019-07-28 PROCEDURE — 25000132 ZZH RX MED GY IP 250 OP 250 PS 637: Mod: GY | Performed by: STUDENT IN AN ORGANIZED HEALTH CARE EDUCATION/TRAINING PROGRAM

## 2019-07-28 RX ADMIN — AMLODIPINE BESYLATE 5 MG: 2.5 TABLET ORAL at 09:43

## 2019-07-28 RX ADMIN — HALOPERIDOL 10 MG: 5 TABLET ORAL at 21:44

## 2019-07-28 ASSESSMENT — ACTIVITIES OF DAILY LIVING (ADL)
ORAL_HYGIENE: INDEPENDENT
DRESS: INDEPENDENT
HYGIENE/GROOMING: HANDWASHING;INDEPENDENT
DRESS: STREET CLOTHES;INDEPENDENT
HYGIENE/GROOMING: INDEPENDENT
ORAL_HYGIENE: INDEPENDENT

## 2019-07-28 NOTE — PLAN OF CARE
"Pt spent majority of shift awake in his room.  This AM pt declined Fluoxetine and was adament \"I don't take that,\" writer attempted discussing this further w/ pt and pt continued to decline and didn't want to discuss further; compliant w/ scheduled Amlodipine.  Pt denied pain, anxiety, SI/SIB/HI.  Pt denied A/VH's however appeared to be responding to internal stimuli--often talking to self in his room.  Pt brief in conversation w/ staff however did report \"I'm feeling better today than I've felt in weeks\" (behavior changed later in shift, see below).  Will continue to monitor and support plan of care.      ADDENDUM: This afternoon pt's mother and sister on unit to visit pt.  Pt became upset during visit and began yelling profanities at mother/sister.  Pt however directed more anger towards sister yelling \"I want you to f*cking go!\"  PA entered situation at this time and pt postured at PA and stated \"Be a man!\"  Mother & sister very concerned about pt's behavior stating pt is not safe to discharge. Mother/sister further stated that pt was calmer last week and this is a significant change in his behavior.  EBONI signed for pt's mother, not for sister, writer therefore shared info w/ mother.  Mother reports, \"He is so off, it's delusional really, I think it's fear that's driving this.\"  Mother states she will call unit later tonight to check in and that she will call doctor tomorrow.  Pt offered PRN however declined.    "

## 2019-07-28 NOTE — PROGRESS NOTES
Pt spends majority of time in room.  Pt is calm and pleasant on approach.  When pt is alone in room writer can hear pt talking to self.  Pt is socially appropriate.  Pt did not attend OT.  Pt ate dinner in their room as well as snack.  Pt denies SI, SIB, and hallucinations.         07/27/19 2204   Behavioral Health   Hallucinations appears responding   Thinking distractable   Orientation person: oriented;place: oriented;date: oriented;time: oriented   Insight poor   Judgement impaired   Eye Contact at examiner   Affect blunted, flat;tense   Mood anxious;mood is calm   Physical Appearance/Attire attire appropriate to age and situation   1. Wish to be Dead No   2. Non-Specific Active Suicidal Thoughts  No   Activity isolative;withdrawn   Speech pressured;clear;coherent   Psychomotor / Gait tremors;balanced;steady   Psycho Education   Type of Intervention 1:1 intervention   Response participates, initiates socially appropriate   Hours 0.5   Treatment Detail Check-in   Activities of Daily Living   Hygiene/Grooming independent   Oral Hygiene independent   Dress independent   Laundry with supervision   Room Organization independent

## 2019-07-28 NOTE — PROGRESS NOTES
"   07/28/19 132   Behavioral Health   Hallucinations appears responding   Thinking distractable   Orientation person: oriented;place: oriented   Insight poor   Judgement impaired   Eye Contact at examiner   Affect full range affect   Mood anxious   Physical Appearance/Attire attire appropriate to age and situation   Hygiene other (see comment)  (did not shower this shift )   Suicidality other (see comments)  (denies)   1. Wish to be Dead No   2. Non-Specific Active Suicidal Thoughts  No   Self Injury other (see comment)  (none observed or reported )   Elopement   (none observed or reported )   Activity isolative   Speech coherent   Medication Sensitivity no stated side effects   Psychomotor / Gait balanced     Pt continues to isolate in his room. Affect has been neutral. Behavior is calm and controlled. Pt was observed to eat his meals, reads in his room, and took naps. Pt reports, \"I'm doing much better.\" Pt is future oriented about his discharge. His goal is to have a good visit with family today. Denies thoughts to hurt himself or others. Contracts for safety.   "

## 2019-07-28 NOTE — PROGRESS NOTES
"Pt had outburst with sister shortly after arriving for visit. This writer overheard Tex yelling and using a lot of profanity. As writer approached door, pt was overheard stating to sister \"Get the fuck out! I want you to leave now!\" Sister came rushing out the door, visibly shaking and upset by Tex's behavior. She stated \"He's not ready to leave.\" As writer redirect Tex that if could not be appropriate his visits would be terminated, he stated agressively, \"Terminated, yeah be a man!\" Shortly after that Mother rushed out of his room upset, stated \"He's not doing well. Has he taken his meds?\"    See RN note.  "

## 2019-07-29 PROCEDURE — 99232 SBSQ HOSP IP/OBS MODERATE 35: CPT | Mod: GC | Performed by: PSYCHIATRY & NEUROLOGY

## 2019-07-29 PROCEDURE — 12400001 ZZH R&B MH UMMC

## 2019-07-29 PROCEDURE — 25000132 ZZH RX MED GY IP 250 OP 250 PS 637: Mod: GY | Performed by: STUDENT IN AN ORGANIZED HEALTH CARE EDUCATION/TRAINING PROGRAM

## 2019-07-29 PROCEDURE — 25000132 ZZH RX MED GY IP 250 OP 250 PS 637: Mod: GY | Performed by: PHYSICIAN ASSISTANT

## 2019-07-29 RX ORDER — HALOPERIDOL 5 MG/ML
10 INJECTION INTRAMUSCULAR DAILY
Status: DISCONTINUED | OUTPATIENT
Start: 2019-07-29 | End: 2019-07-30 | Stop reason: HOSPADM

## 2019-07-29 RX ADMIN — AMLODIPINE BESYLATE 5 MG: 2.5 TABLET ORAL at 09:16

## 2019-07-29 RX ADMIN — HALOPERIDOL 12.5 MG: 5 TABLET ORAL at 20:53

## 2019-07-29 ASSESSMENT — ACTIVITIES OF DAILY LIVING (ADL)
LAUNDRY: WITH SUPERVISION
HYGIENE/GROOMING: INDEPENDENT
ORAL_HYGIENE: INDEPENDENT
ORAL_HYGIENE: INDEPENDENT
DRESS: STREET CLOTHES;INDEPENDENT
DRESS: STREET CLOTHES;INDEPENDENT
HYGIENE/GROOMING: INDEPENDENT

## 2019-07-29 NOTE — PLAN OF CARE
"Tex had quite an outburst at his mother and sister today (see former note by day staff), involving profanity. He declines to check in so far this evening. Says that he is busy working on his discharge plan. He is lying on his bed, prone, with no eye contact with this nurse. His affect remains flat. He said that he had expected discharge tomorrow, but there was a \"miscommunication\" between outside  and the  here regarding time and day of family meeting.    22:40 After checking in with Tex, he showed how he is working with papers to plan his discharge. He clarified that the argument earlier was primarily between him and his sister. \"We don't get along that well.\" He said that it was simply a family disagreement. Said that he already had spoken with his mother on the phone and that they were now getting along fine. He had heard a lot of loud noises from a female patient across the bose in the past 2 days. He said that no one made a fuss about that, so he didn't understand why a psych associate had approached him about his yelling and profanity.   "

## 2019-07-29 NOTE — PROGRESS NOTES
"   07/29/19 1400   Occupational Therapy   Type of Intervention structured groups   Response Initiates, socially acceptable   Hours 1     Attended a mental health management group focused on goal setting for hopefulness and self-development related to meaningful occupations.  Pt Response: Congruent and highly engaged throughout discussion. Demonstrated recent increase in insight as he reported a \"need to stabilize my mental health\" and articulately elaborated on this. Spoke frequently about God - how PT believed he was following through with the actions requested by God - however appears to not believe this now. Rather, believes God is in control more indirectly.   "

## 2019-07-29 NOTE — PROGRESS NOTES
"Pt had a good shift. Spent much of the shift in his room. Was observed pacing in room talking to himself. He denies AH/VH but does appear to be responding to them. Denies SI/SIB, but states that he is depressed from being in the hospital. He states this depression is \"normal period\" and did not want to elaborate. States that sleep and appetite have been fine while in the hospital. Kept check in very brief. Pt seems to be very paranoid. This was evident while talking to him. Pt has no concerns at this time and denies side effects to his medications. No significant events to note this shift.      07/29/19 1209   Behavioral Health   Hallucinations appears responding   Thinking poor concentration;distractable;paranoid   Orientation person: oriented;place: oriented;date: oriented;time: oriented   Memory baseline memory   Insight poor   Judgement impaired   Eye Contact at examiner   Affect full range affect   Mood mood is calm   Physical Appearance/Attire appears stated age;attire appropriate to age and situation;neat   Hygiene well groomed   Suicidality other (see comments)  (Denies)   1. Wish to be Dead No   2. Non-Specific Active Suicidal Thoughts  No   Self Injury other (see comment)  (Denies)   Elopement   (None observed)   Activity isolative;withdrawn   Speech clear;coherent;pressured   Medication Sensitivity no stated side effects;no observed side effects   Psychomotor / Gait balanced;steady   Activities of Daily Living   Hygiene/Grooming independent   Oral Hygiene independent   Dress street clothes;independent   Laundry with supervision   Room Organization independent     "

## 2019-07-29 NOTE — PROGRESS NOTES
"    ----------------------------------------------------------------------------------------------------------  [facility], [place]   Psychiatric Progress Note  Hospital Day #40     Interim History:   The patient's care was discussed with the treatment team and chart notes were reviewed.    Sleep: 5.5 hrs on Friday night, 6.25 hrs on Saturday night, 6 hrs on Sunday night  Scheduled Medications: Took all as prescribed except fluoxetine (declined).  PRN Medications: None.    Staff Report:   Friday: Tex visited with his mother and stated that he was looking forward to discharge. He was noted be yelling and talking to himself in his room.  Saturday: Noted to be isolative, although \"socially appropriate\". Staff reports continue to mention that Tex was talking to himself.   Sunday: Noted by staff to appear to be responding to internal stimuli. Tex told staff \"I'm feeling better today than I've felt in weeks\". He did get upset during a visit with his mother and sister and was noted to be yelling profanities. His mother and sister report that he was calmer earlier last week and that this has been a significant change. Later in the evening he continued to be isolative and told staff that he was focusing on his discharge plan and that his relationship with his sister has always been strained.    Patient Interview: Patient was interviewed in his room on station 22. Tex stated that he was feeling \"okay\" and that he was disappointed that his discharge meeting would not be happening today. He stated that the argument with his family last night was his mother and sister's fault and that they \"came over here with a bad attitude\". He stated that he has \"always had a rough relationship\" with his sister. We discussed increasing his dose of haloperidol to 12.5 mg daily. He stated that he has a bad feeling about the effect of haldol on his body and mind. Asked to elaborate, he discussed difficulty with " "sexual functioning and wanted clarification on which class of medication haloperidol belongs to. He was amenable to increasing the haloperidol, although did express that he did not like the \"never-ending list of side effects\".     Phone call with Tex's mother: She expressed concern regarding his behavior over the weekend and at the end of last week. She has observed \"rambling, manicy talk\" as well as \"mistrustful statements\". She described him as \"verbally abusive\" during her visit last night and is thinking that the inpatient environment is contributing to his stress. He wants to discharge and has told her that he is being \"treat[ed] like a piece of meat\".  We discussed that we would call back after seeing Tex and would set up a family meeting for sometime in the upcoming days.    The risks, benefits, alternatives and side effects of any medication changes have been discussed and are understood by the patient and other caregivers.    Review of systems:     ROS was negative unless noted above.     Allergies:     Allergies   Allergen Reactions     Mold      Itchy eyes, congestion     Nkda [No Known Drug Allergies]        Psychiatric Examination:   BP (!) 138/98 (BP Location: Right arm)   Pulse 97   Temp 97  F (36.1  C) (Tympanic)   Resp 16   Ht 1.829 m (6')   Wt 142.7 kg (314 lb 8 oz)   SpO2 97%   BMI 42.65 kg/m    Weight is 314 lbs 8 oz  Body mass index is 42.65 kg/m .    Appearance:  Awake, alert, dressed in own clothing.  Attitude:  Somewhat cooperative, guarded  Eye Contact:  poor , occasionally looked at interviewer but most of the time his gaze was directed at the ground.  Mood:  \"okay\"  Affect:  mood congruent and intensity is blunted  Speech:  Rapid but interruptable and coherent.  Psychomotor Behavior:  no evidence of tardive dyskinesia, dystonia, or tics  Thought Process:  linear  Associations:  no loose associations  Thought Content:  Does not appear to be responding to internal stimuli.  Insight: "  limited  Judgment:  limited  Oriented to:  time, person, and place  Attention Span and Concentration:  Adequate for conversation.  Recent and Remote Memory:  intact  Language: Speaks English with appropriate syntax and vocabulary.  Fund of Knowledge: Unable to assess  Muscle Strength and Tone: Appears grossly normal except for bilateral tremor in upper extremities.   Gait and Station: Normal       Labs:   No results found for this or any previous visit (from the past 24 hour(s)).     Assessment    Diagnostic Impression: Tex Garland is a 39 year old male with a past psychiatric history of schizoaffective disorder depressed type and seasonal affective disorder presented due to paranoia, disorganization, and agitation in the context of medication nonadherence. The patient has no known family history of mental illness. Current social factors include unemployment, living alone, not having phone, and father with Parkinson's/Lewy Body dementia. Psychological factors include previous diagnoses of schizoaffective disorder depressed type and seasonal affective disorders. Biological factors include medication non-compliance. Provisional diagnosis of schizophrenia vs schizoaffective disorder depressed type, although his guardedness does make it difficult to assess mood.    Hospital course: Tex Garland was admitted to [address] on a 72 hour hold. Restarting aripiprazole or other antipsychotics was discussed with the patient, but he refused, as he attributed anorgasmia due to Abilify. Team recommended tapering off fluoxetine due to its adverse side effect, but he declined. Since he was declining antipsychotics and therapeutic benefit of fluxoetine is uncertain, it was discontinued . Due to the need for police de-esclation in the past and a history of altercations with strangers, as well as poor hygiene including rotting food in apartment, there was concern for harm to self or to others. Thus,  petition for commitment and Nicole was initiated.  On 6/28 a code was called for patient's worsening agitation (yelling at and swearing at peers, slamming doors, slamming plate in room, slamming toilet seat in room); he took oral olanzapine and Ativan, and afterward was calm in his room. He required emergency forced medication (oral olanzapine and Ativan) daily starting 6/29 for safety due to his psychotic symptoms. Prozac was re-initiated due to patient request and history of medicine being efficacious for mood. He at times denied wanting the Prozac, but requested it continued to be offered in the AM. Nicole and commitment order received 7/9. Due to likely akathisia, limited response to zyprexa, and concern for metabolic side effects, zyprexa was discontinued for psychosis, and haldol initiated. Haldol increased from 5mg to 10mg on 7/15 due to continued psychosis. Significant improvement was seen with with 10mg haldol. He was able to be present in the milieu, having longer and less guarded conversations with staff and treatment team, and began to shower and engage in other ADLs. Although he continued to seem overall improved, his family noticed that his speech was rapid and he had a verbal outburst during their meeting, therefore the team thought that Tex could benefit from a slight increase in his haloperidol dose, to 12.5 mg at bedtime on 7/29.    Discontinued Medications (& Rationale):   - Abilify: patient believes this causes anorgasmia.  - Zyprexa: akathisia, metabolic side effects    Medical course: Patient was medically cleared for admission to the inpatient psychiatry unit.     Plan   Principal Diagnosis:   # Schizophrenia vs schizoaffective disorder, depressive type  - Increase haloperidol to 12.5 mg PO at bedtime  - Continue fluoxetine 20 mg daily, per patient request  - Re-assess depressive symptoms in future.  - Consider treatment with [facility].  - Haldol 5 mg IM and diphenhydramine 50 mg IM and 2 mg  lorazepam IM PRN Q6H for agitation  - Hydroxyzine 25 mg Q4H PRN for anxiety  - Lorazepam 1 mg PO BID PRN for anxiety  - Trazodone 50 mg PO at bedtime PRN for insomnia, pay repeat x1.  - Plan for family meeting early this week.  - Consider ACT team.   - Consider day treatment and/or [facility].    Patient will be treated in therapeutic milieu with appropriate individual and group therapies as described.    Medical diagnoses to be addressed this admission:      # Hypertension: Blood pressure is within normal range today (7/26).  - Continue amlodipine 5 mg daily.  - Continue to monitor blood pressure daily.   - Medicine service monitoring peripherally.    # Asthma  - Continue PTA albuterol PRN.    Legal Status: Committed with Nicole    Safety Assessment:   Behavioral Orders   Procedures     Code 1 - Restrict to Unit     Elopement precautions     Routine Programming     As clinically indicated     Sexual precautions     Single Room     Status 15     Every 15 minutes.       Disposition: Pending clinical stabilization and medication management. Will likely discharge to home this week.     Patient seen & discussed with attending psychiatrist, Shawna Lugo MD, PhD.  Damari Mcelroy MD  Psychiatry PGY-1    Attestation:  This patient has been seen and evaluated by me, Shawna Lugo.  I have discussed this patient with the house staff team including the resident and medical student and I agree with the findings and plan in this note.    I have reviewed today's vital signs, medications, labs and imaging. Shawna Lugo M.D., Ph.D.

## 2019-07-30 ENCOUNTER — TELEPHONE (OUTPATIENT)
Dept: BEHAVIORAL HEALTH | Facility: CLINIC | Age: 39
End: 2019-07-30

## 2019-07-30 VITALS
HEART RATE: 96 BPM | TEMPERATURE: 98 F | SYSTOLIC BLOOD PRESSURE: 142 MMHG | HEIGHT: 72 IN | RESPIRATION RATE: 16 BRPM | BODY MASS INDEX: 42.6 KG/M2 | WEIGHT: 314.5 LBS | OXYGEN SATURATION: 96 % | DIASTOLIC BLOOD PRESSURE: 87 MMHG

## 2019-07-30 PROCEDURE — 99238 HOSP IP/OBS DSCHRG MGMT 30/<: CPT | Mod: GC | Performed by: PSYCHIATRY & NEUROLOGY

## 2019-07-30 PROCEDURE — 25000132 ZZH RX MED GY IP 250 OP 250 PS 637: Mod: GY | Performed by: PHYSICIAN ASSISTANT

## 2019-07-30 RX ORDER — ALBUTEROL SULFATE 90 UG/1
2-4 AEROSOL, METERED RESPIRATORY (INHALATION) EVERY 4 HOURS PRN
Qty: 1 INHALER | Refills: 0 | Status: SHIPPED | OUTPATIENT
Start: 2019-07-30

## 2019-07-30 RX ORDER — HALOPERIDOL 0.5 MG/1
2.5 TABLET ORAL DAILY
Qty: 150 TABLET | Refills: 0 | Status: SHIPPED | OUTPATIENT
Start: 2019-07-30

## 2019-07-30 RX ORDER — AMLODIPINE BESYLATE 5 MG/1
5 TABLET ORAL DAILY
Qty: 30 TABLET | Refills: 0 | Status: SHIPPED | OUTPATIENT
Start: 2019-07-31

## 2019-07-30 RX ORDER — HALOPERIDOL 10 MG/1
10 TABLET ORAL DAILY
Qty: 30 TABLET | Refills: 0 | Status: SHIPPED | OUTPATIENT
Start: 2019-07-30

## 2019-07-30 RX ADMIN — AMLODIPINE BESYLATE 5 MG: 2.5 TABLET ORAL at 09:02

## 2019-07-30 ASSESSMENT — ACTIVITIES OF DAILY LIVING (ADL)
ORAL_HYGIENE: INDEPENDENT
DRESS: INDEPENDENT
HYGIENE/GROOMING: INDEPENDENT
LAUNDRY: WITH SUPERVISION
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
## 2019-07-30 NOTE — DISCHARGE INSTRUCTIONS
Behavioral Discharge Planning and Instructions      Summary:  You were admitted on 6/19/2019 due to Disorganized Thinking/Behaviors and Psychotic Symptomology.  You were treated by Dr. Shawna Lugo MD and Dr. Jake Phillip MD and discharged on 07/30/19 from [facility].    Today you will be Provisionally Discharged from this hospital. You have been court ordered for commitment for treatment and will be discharged from the hospital today. You have agreed to the terms of a Provisional Discharge  (please refer to your copy of the agreement) The minimal period of this Provisional Discharge Agreement being active is until your commitment order is terminated by the courts.     Principal Diagnosis:   Schizophrenia vs schizoaffective disorder, depressive type    Health Care Follow-up Appointments:   Date/Time: Tuesday, 8/13/19 at 11:20am   Provider: Dr. Barraza   Address: [facility]    Phone: [phone] Fax: [phone]    Individual Occupational Art Therapy Referral:  [address], [address] [address]  Phone: [phone]  Fax: [phone]    Attend all scheduled appointments with your outpatient providers. Call at least 24 hours in advance if you need to reschedule an appointment to ensure continued access to your outpatient providers.   Major Treatments, Procedures and Findings:  You were provided with: a psychiatric assessment, assessed for medical stability, medication evaluation and/or management, group therapy and milieu management    Symptoms to Report: Feeling more aggressive, increased confusion, losing more sleep, mood getting worse or thoughts of suicide    Early warning signs can include: Increased depression or anxiety sleep disturbances increased thoughts or behaviors of suicide or self-harm  increased unusual thinking, such as paranoia or hearing voices    Safety and Wellness:  Take all medicines as directed.  Make no changes unless your doctor  suggests them.  Follow treatment recommendations.  Refrain from alcohol and non-prescribed drugs.  If there is a concern for safety, call 911.    Resources:   Crisis Intervention: 985.525.7628 or 430-448-4552 (TTY: 835.206.4339).  Call anytime for help.  [employer] [place] on Mental Illness (www.[place].nick.org): 871.835.8893 or 120-500-9579.  Suicide Awareness Voices of Education (SAVE) (www.save.org): 918-851-JFFS (9528)  National Suicide Prevention Line (www.mentalhealthmn.org): 502-048-GYJL (9084)  Mental Health Consumer/Survivor Network of [place] (www.mhcsn.net): 726.589.7259 or 103-517-3654  Mental Health Association of [place] (www.mentalhealth.org): 323.476.6070 or 082-883-2941  [facility] Crisis (COPE) Response - Adult [phone]  [facility] Crisis Team - Child: [phone]    The treatment team has appreciated the opportunity to work with you.     If you have any questions or concerns our unit number is [phone]  You may be receiving a follow-up phone call within the next three days from a representative from behavioral health.

## 2019-07-30 NOTE — PROGRESS NOTES
Pt was isolative and socially withdrawn throughout the shift. Pt was observed reading, writing, laying/sleeping in room, and eating snack/dinner. Pt presented with a tense to full range affect and appeared calm (albeit anxious). Pt denied experiencing any mental health symptoms with the exception of anxiety. Pt denied experiencing SI, HI, or SIB.        07/29/19 2104   Behavioral Health   Hallucinations denies / not responding to hallucinations   Thinking distractable;poor concentration   Orientation person: oriented;place: oriented;date: oriented   Memory   (appears baseline)   Insight insight appropriate to situation;insight appropriate to events   Judgement impaired   Eye Contact   (pt avoids eye contact)   Affect tense;full range affect   Mood anxious;mood is calm   Physical Appearance/Attire untidy   Hygiene neglected grooming - unclean body, hair, teeth   Suicidality   (pt denies thoughts and urges)   Self Injury   (pt denies thoughts and urges)   Elopement   (no apparent risk)   Activity isolative;withdrawn   Speech clear;coherent   Medication Sensitivity no stated side effects;no observed side effects   Psychomotor / Gait balanced;steady   Psycho Education   Type of Intervention 1:1 intervention   Response participates, initiates socially appropriate   Hours 0.5   Treatment Detail   (check-in)   Activities of Daily Living   Hygiene/Grooming independent   Oral Hygiene independent   Dress street clothes;independent   Laundry   (pt did not do laundry)   Room Organization independent   Activity   Activity Assistance Provided independent

## 2019-07-30 NOTE — PROGRESS NOTES
Re: Discharge planning    Writer faxed provisional discharge and change of status form to [facility]. Copy given to  after discharge meeting. Copy provided to patient.    Dipti Cobb, LPCC, LADC

## 2019-07-30 NOTE — TELEPHONE ENCOUNTER
PA Initiation    Medication: Ventolin inhaler - Quantity Limits  Insurance Company: [employer] - Phone [phone] Fax [phone]  Pharmacy Filling the Rx: [facility] - [place], [place] - [address]  Filling Pharmacy Phone: [phone]  Filling Pharmacy Fax: [phone]  Start Date: 7/30/2019  CM Key: [id number] - PA Case ID: [id number]    Leslee [facility] Discharge Pharmacy Liaison     [facility]  [address]  [address], [place] [address]   [email or web address]  [email or web address]   Phone: [phone]  Pager: [phone]  Fax: [phone]

## 2019-07-31 NOTE — TELEPHONE ENCOUNTER
Prior Authorization Approval    Authorization Effective Date: 1/1/2019  Authorization Expiration Date: 12/31/2019  Medication: Ventolin inhaler - Quantity Limits  Approved Dose/Quantity: Up to 24 puffs daily  Reference #: CMM Key: [id number] - PA Case ID: [id number]   Insurance Company: [employer] - Phone [phone] Fax [phone]  Expected CoPay: $1.25     CoPay Card Available: No    Foundation Assistance Needed: n/a  Which Pharmacy is filling the prescription (Not needed for infusion/clinic administered): [facility] [place], [place] - [address]  Pharmacy Notified: Yes  Patient Notified: No        Leslee Marcum  [place] Discharge Pharmacy Liaison     [facility]  [address]  [address], [place] [address]   [email or web address]  [email or web address]   Phone: [phone]  Pager: [phone]  Fax: [phone]

## 2019-07-31 NOTE — PLAN OF CARE
Tex discharged 1402 care of mom-Tex swanson and varsha while reviewing discharge instructions-verbalizes understanding and agrees to medication schedule and outpt tx plan-does continue talking to self at times while in room-has 30 day supply of medications in his possession
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
